# Patient Record
Sex: MALE | Race: BLACK OR AFRICAN AMERICAN | Employment: FULL TIME | URBAN - METROPOLITAN AREA
[De-identification: names, ages, dates, MRNs, and addresses within clinical notes are randomized per-mention and may not be internally consistent; named-entity substitution may affect disease eponyms.]

---

## 2017-01-13 ENCOUNTER — OP HISTORICAL/CONVERTED ENCOUNTER (OUTPATIENT)
Dept: OTHER | Age: 52
End: 2017-01-13

## 2017-02-03 ENCOUNTER — OP HISTORICAL/CONVERTED ENCOUNTER (OUTPATIENT)
Dept: OTHER | Age: 52
End: 2017-02-03

## 2017-04-12 ENCOUNTER — OP HISTORICAL/CONVERTED ENCOUNTER (OUTPATIENT)
Dept: OTHER | Age: 52
End: 2017-04-12

## 2017-04-21 ENCOUNTER — OP HISTORICAL/CONVERTED ENCOUNTER (OUTPATIENT)
Dept: OTHER | Age: 52
End: 2017-04-21

## 2017-05-17 ENCOUNTER — OP HISTORICAL/CONVERTED ENCOUNTER (OUTPATIENT)
Dept: OTHER | Age: 52
End: 2017-05-17

## 2017-07-14 ENCOUNTER — OP HISTORICAL/CONVERTED ENCOUNTER (OUTPATIENT)
Dept: OTHER | Age: 52
End: 2017-07-14

## 2018-02-21 ENCOUNTER — OP HISTORICAL/CONVERTED ENCOUNTER (OUTPATIENT)
Dept: OTHER | Age: 53
End: 2018-02-21

## 2018-06-05 ENCOUNTER — OFFICE VISIT (OUTPATIENT)
Dept: ENDOCRINOLOGY | Age: 53
End: 2018-06-05

## 2018-06-05 VITALS
BODY MASS INDEX: 39.17 KG/M2 | WEIGHT: 315 LBS | SYSTOLIC BLOOD PRESSURE: 139 MMHG | TEMPERATURE: 97 F | HEART RATE: 71 BPM | DIASTOLIC BLOOD PRESSURE: 76 MMHG | OXYGEN SATURATION: 98 % | HEIGHT: 75 IN | RESPIRATION RATE: 20 BRPM

## 2018-06-05 DIAGNOSIS — E78.2 MIXED HYPERLIPIDEMIA: ICD-10-CM

## 2018-06-05 DIAGNOSIS — I10 ESSENTIAL HYPERTENSION: ICD-10-CM

## 2018-06-05 DIAGNOSIS — E66.01 OBESITY, MORBID (HCC): ICD-10-CM

## 2018-06-05 DIAGNOSIS — E11.65 UNCONTROLLED TYPE 2 DIABETES MELLITUS WITH HYPERGLYCEMIA, WITHOUT LONG-TERM CURRENT USE OF INSULIN (HCC): Primary | ICD-10-CM

## 2018-06-05 LAB
GLUCOSE POC: 249 MG/DL
HBA1C MFR BLD HPLC: 9.7 %

## 2018-06-05 RX ORDER — DULAGLUTIDE 1.5 MG/.5ML
INJECTION, SOLUTION SUBCUTANEOUS
COMMUNITY
Start: 2018-05-22 | End: 2019-03-12 | Stop reason: SDUPTHER

## 2018-06-05 RX ORDER — VALSARTAN 320 MG/1
TABLET ORAL
COMMUNITY
Start: 2018-04-25 | End: 2018-08-08 | Stop reason: ALTCHOICE

## 2018-06-05 RX ORDER — SIMVASTATIN 20 MG/1
TABLET, FILM COATED ORAL
COMMUNITY
Start: 2018-04-26 | End: 2020-07-29 | Stop reason: SDUPTHER

## 2018-06-05 RX ORDER — SPIRONOLACTONE 25 MG/1
TABLET ORAL
COMMUNITY
Start: 2018-05-06 | End: 2020-07-29 | Stop reason: SDUPTHER

## 2018-06-05 RX ORDER — GLYBURIDE 5 MG/1
TABLET ORAL
COMMUNITY
Start: 2018-04-26 | End: 2018-06-05 | Stop reason: ALTCHOICE

## 2018-06-05 RX ORDER — SITAGLIPTIN AND METFORMIN HYDROCHLORIDE 100; 1000 MG/1; MG/1
TABLET, FILM COATED, EXTENDED RELEASE ORAL
COMMUNITY
Start: 2018-03-30 | End: 2019-03-12 | Stop reason: SDUPTHER

## 2018-06-05 RX ORDER — CARVEDILOL 12.5 MG/1
TABLET ORAL
COMMUNITY
Start: 2018-05-21 | End: 2020-07-29 | Stop reason: SDUPTHER

## 2018-06-05 RX ORDER — CHOLECALCIFEROL (VITAMIN D3) 125 MCG
CAPSULE ORAL
COMMUNITY

## 2018-06-05 RX ORDER — ASCORBIC ACID 500 MG
TABLET ORAL
COMMUNITY
End: 2021-03-22 | Stop reason: ALTCHOICE

## 2018-06-05 NOTE — PATIENT INSTRUCTIONS
--------------------------------------------------------------------------------------------    Refills    -    please call your pharmacy and have them send us a refill request    Results  -  allow up to a week for lab results to be processed and reviewed. Phone calls  -  Allow upto 24 hrs.  for non-urgent calls to be retained    Prior authorization - It may take up to 2 weeks to process, depending on your insurance    Forms  -  FMLA, DMV, patient assistance, etc. will take up to 2 weeks to process    Cancellations - please notify the office in advance if you cannot keep your appointment    Samples  - will only be dispensed at visits as supply is limited      If you are having a medical emergency call 911    --------------------------------------------------------------------------------------------      Check blood sugars only twice a day by rotation as follows    First day - before b-fast and - before lunch  Second day- before dinner and  before bed time    After 2 days go back to same rotation          Instead [id number] [facility]               Stop glyburide   Start on glipizide 10 mg twice a day, twenty min before b-fast and dinner     (Do not take it if you are not eating   Cut the pill to half if eating lesser than normal   Do not avoid lunches         Start on cycloset  0.6 mg   Start on cycloset 0.8 mg one pill first week, 2 pills second week and 3 pills on third week within 2 hours of getting up in the morning        Change janumet to janumet xr 50 /1000 mg twice a day with meals       Start on  jardiance 25   Mg , right before   b-fast    ( drink plenty of water )    Stay on Trulicity at 1.5 mg a week

## 2018-06-05 NOTE — PROGRESS NOTES
HISTORY OF PRESENT ILLNESS  Leah Payne is a 45 y.o. male. HPI  Patient here for initial visit of Type 2 diabetes mellitus . Referred : by self/pcp    H/o diabetes for many years     Current A1C is 11.6 %  From feb 2018  and symptoms/problems include polyuria, polydipsia and visual disturbances     Current diabetic medications include intensive insulin injection program.     Current monitoring regimen: home blood tests - 2 times daily  Home blood sugar records: trend: fluctuating a lot  Any episodes of hypoglycemia? yes - multiple    Weight trend: fluctuating a lot  Prior visit with dietician: no  Current diet: \"unhealthy\" diet in general  Current exercise: no regular exercise    Known diabetic complications: retinopathy, nephropathy and peripheral neuropathy  Cardiovascular risk factors: family history, dyslipidemia, diabetes mellitus, sedentary life style, male gender, hypertension, stress    Eye exam current (within one year): yes  BALJEET: unknown     Past Medical History:   Diagnosis Date    [facility]     Hypertension      History reviewed. No pertinent surgical history. Current Outpatient Prescriptions   Medication Sig    TRULICITY 1.5 XQ/0.5 mL sub-q pen     carvedilol (COREG) 12.5 mg tablet     glyBURIDE (DIABETA) 5 mg tablet     simvastatin (ZOCOR) 20 mg tablet     JANUMET -1,000 mg TM24     spironolactone (ALDACTONE) 25 mg tablet     valsartan (DIOVAN) 320 mg tablet     cholecalciferol, vitamin D3, (VITAMIN D3) 2,000 unit tab Take  by mouth.  ascorbic acid, vitamin C, (VITAMIN C) 500 mg tablet Take  by mouth.  KOLE ASPIRIN PO Take  by mouth. No current facility-administered medications for this visit. Review of Systems   Constitutional: Negative. HENT: Negative. Eyes: Negative for pain and redness. Respiratory: Negative. Cardiovascular: Negative for chest pain, palpitations and leg swelling. Gastrointestinal: Negative. Negative for constipation. Genitourinary: Negative. Musculoskeletal: Negative for myalgias. Skin: Negative. Neurological: Negative. Endo/Heme/Allergies: Negative. Psychiatric/Behavioral: Negative for depression and memory loss. The patient does not have insomnia. Physical Exam   Constitutional: He is oriented to person, place, and time. He appears well-developed and well-nourished. HENT:   Head: Normocephalic. EDENTULOUS   Eyes: Conjunctivae and EOM are normal. Pupils are equal, round, and reactive to light. Neck: Normal range of motion. Neck supple. No JVD present. No tracheal deviation present. No thyromegaly present. Cardiovascular: Normal rate, regular rhythm and normal heart sounds. Pulmonary/Chest: Effort normal and breath sounds normal.   Abdominal: Soft. Bowel sounds are normal.   Musculoskeletal: Normal range of motion. Lymphadenopathy:     He has no cervical adenopathy. Neurological: He is alert and oriented to person, place, and time. He has normal reflexes. Skin: Skin is warm. Psychiatric: He has a normal mood and affect. ASSESSMENT and PLAN    1.  Type 2 DM poorly  controlled : a1c is 9.7 %   Today by POC   Compared to over 11 %  From feb 2018     Discussed type 2 diabetes pathophysiology    Start on glipizide 10 mg twice a day, twenty min before b-fast and dinner   Start on cycloset  0.6 mg   Start on cycloset 0.8 mg one pill first week, 2 pills second week and 3 pills on third week within 2 hours of getting up in the morning    Change janumet to janumet xr 50 /1000 mg twice a day with meals   Start on  jardiance 25   Mg , right before   b-fast    ( drink plenty of water )  Stay on Trulicity at 1.5 mg a week     Patient is advised to check blood sugars 3-4 times daily   reviewed medications and side effects in detail  lab results and schedule of future lab studies reviewed with patient    specific diabetic recommendations: diabetic diet discussed in detail, written exchange diet given, low cholesterol diet, weight control and daily exercise discussed, home glucose monitoring emphasized, glucose meter dispensed to patient, all medications, side effects and compliance discussed carefully, foot care discussed and Podiatry visits discussed, annual eye examinations at Ophthalmology discussed, glycohemoglobin and other lab monitoring discussed, long term diabetic complications discussed and labs immediately prior to next visit    2. Hypoglycemia :  Educated on treating the hypoglycemia. 3. HTN : continue current care. Patient is educated about importance of compliance with anti-hypertensives especially ARB/ACEI    4. Dyslipidemia : continue current meds. Patient is educated about benefits and adverse effects of statins and explained how benefits outweigh risk. 5. use of aspirin to prevent MI and TIA's discussed      6. Obesity : Body mass index is 41.85 kg/(m^2).   Discussed TLC and started on inj incretins    > 50 % of time is spent on counseling   Patient voiced understanding her plan of care

## 2018-06-05 NOTE — LETTER
6/10/2018 7:38 PM 
 
Patient:  Jennifer Salmon YOB: 1965 Date of Visit: 6/5/2018 Dear Denzel Pacheco MD 
[address] [address] VIA Facsimile: [phone] 
 : Thank you for referring Mr. Ramón Greene to me for evaluation/treatment. Below are the relevant portions of my assessment and plan of care. Wt Readings from Last 3 Encounters:  
06/05/18 334 lb 12.8 oz (151.9 kg) Temp Readings from Last 3 Encounters:  
06/05/18 97 °F (36.1 °C) (Oral) BP Readings from Last 3 Encounters:  
06/05/18 139/76 Pulse Readings from Last 3 Encounters:  
06/05/18 71 Patient does not have meter or logbook today. Patient unsure of eye doctor's name. HISTORY OF PRESENT ILLNESS Jennifer Salmon is a 45 y.o. male. HPI Patient here for initial visit of Type 2 diabetes mellitus . Referred : by self/pcp H/o diabetes for many years Current A1C is 11.6 %  From feb 2018  and symptoms/problems include polyuria, polydipsia and visual disturbances Current diabetic medications include intensive insulin injection program.  
 
Current monitoring regimen: home blood tests - 2 times daily Home blood sugar records: trend: fluctuating a lot Any episodes of hypoglycemia? yes - multiple Weight trend: fluctuating a lot Prior visit with dietician: no 
Current diet: \"unhealthy\" diet in general 
Current exercise: no regular exercise Known diabetic complications: retinopathy, nephropathy and peripheral neuropathy Cardiovascular risk factors: family history, dyslipidemia, diabetes mellitus, sedentary life style, male gender, hypertension, stress Eye exam current (within one year): yes BALJEET: unknown Past Medical History:  
Diagnosis Date  Diabetes ([facility]  Hypertension History reviewed. No pertinent surgical history. Current Outpatient Prescriptions Medication Sig  TRULICITY 1.5 ZY/9.8 mL sub-q pen  carvedilol (COREG) 12.5 mg tablet  glyBURIDE (DIABETA) 5 mg tablet  simvastatin (ZOCOR) 20 mg tablet  JANUMET -1,000 mg TM24   
 spironolactone (ALDACTONE) 25 mg tablet  valsartan (DIOVAN) 320 mg tablet  cholecalciferol, vitamin D3, (VITAMIN D3) 2,000 unit tab Take  by mouth.  ascorbic acid, vitamin C, (VITAMIN C) 500 mg tablet Take  by mouth.  KOLE ASPIRIN PO Take  by mouth. No current facility-administered medications for this visit. Review of Systems Constitutional: Negative. HENT: Negative. Eyes: Negative for pain and redness. Respiratory: Negative. Cardiovascular: Negative for chest pain, palpitations and leg swelling. Gastrointestinal: Negative. Negative for constipation. Genitourinary: Negative. Musculoskeletal: Negative for myalgias. Skin: Negative. Neurological: Negative. Endo/Heme/Allergies: Negative. Psychiatric/Behavioral: Negative for depression and memory loss. The patient does not have insomnia. Physical Exam  
Constitutional: He is oriented to person, place, and time. He appears well-developed and well-nourished. HENT:  
Head: Normocephalic. EDENTULOUS Eyes: Conjunctivae and EOM are normal. Pupils are equal, round, and reactive to light. Neck: Normal range of motion. Neck supple. No JVD present. No tracheal deviation present. No thyromegaly present. Cardiovascular: Normal rate, regular rhythm and normal heart sounds. Pulmonary/Chest: Effort normal and breath sounds normal.  
Abdominal: Soft. Bowel sounds are normal.  
Musculoskeletal: Normal range of motion. Lymphadenopathy:  
  He has no cervical adenopathy. Neurological: He is alert and oriented to person, place, and time. He has normal reflexes. Skin: Skin is warm. Psychiatric: He has a normal mood and affect. ASSESSMENT and PLAN 1. Type 2 DM poorly  controlled : a1c is 9.7 %   Today by POC   Compared to over 11 %  From feb 2018 Discussed type 2 diabetes pathophysiology Start on glipizide 10 mg twice a day, twenty min before b-fast and dinner Start on cycloset  0.6 mg  
Start on cycloset 0.8 mg one pill first week, 2 pills second week and 3 pills on third week within 2 hours of getting up in the morning Change janumet to janumet xr 50 /1000 mg twice a day with meals Start on  jardiance 25   Mg , right before   b-fast    ( drink plenty of water ) Stay on Trulicity at 1.5 mg a week Patient is advised to check blood sugars 3-4 times daily  
reviewed medications and side effects in detail 
lab results and schedule of future lab studies reviewed with patient 
 
specific diabetic recommendations: diabetic diet discussed in detail, written exchange diet given, low cholesterol diet, weight control and daily exercise discussed, home glucose monitoring emphasized, glucose meter dispensed to patient, all medications, side effects and compliance discussed carefully, foot care discussed and Podiatry visits discussed, annual eye examinations at Ophthalmology discussed, glycohemoglobin and other lab monitoring discussed, long term diabetic complications discussed and labs immediately prior to next visit 2. Hypoglycemia :  Educated on treating the hypoglycemia. 3. HTN : continue current care. Patient is educated about importance of compliance with anti-hypertensives especially ARB/ACEI 4. Dyslipidemia : continue current meds. Patient is educated about benefits and adverse effects of statins and explained how benefits outweigh risk. 5. use of aspirin to prevent MI and TIA's discussed 6. Obesity : Body mass index is 41.85 kg/(m^2). Discussed TLC and started on inj incretins > 50 % of time is spent on counseling Patient voiced understanding her plan of care If you have questions, please do not hesitate to call me. I look forward to following Mr. Ya Zavala along with you. Sincerely, Mimi De La Torre MD

## 2018-06-05 NOTE — MR AVS SNAPSHOT
[address] [facility] [address] 
[phone] Patient: Sreekanth Olsen MRN: [id number] :1965 Visit Information Date & Time Provider Department Dept. Phone Encounter #  
 2018  4:00 PM Claudene Maffucci, MD [facility] [phone] [id number] Follow-up Instructions Return in about 2 months (around 2018). Upcoming Health Maintenance Date Due Hepatitis C Screening 1965 DTaP/Tdap/Td series (1 - Tdap) 1984 FOBT Q 1 YEAR AGE 50-75 2013 Influenza Age 5 to Adult 2018 Allergies as of 2018  Review Complete On: 2018 By: Claudene Maffucci, MD  
  
 Severity Noted Reaction Type Reactions Pcn [Penicillins]  2018    Hives Current Immunizations  Never Reviewed No immunizations on file. Not reviewed this visit You Were Diagnosed With   
  
 Codes Comments Type 2 diabetes mellitus with hyperglycemia, unspecified whether long term insulin use (HCC)    -  Primary ICD-10-CM: E11.65 ICD-9-CM: 250.00 Vitals BP Pulse Temp Resp Height(growth percentile) Weight(growth percentile) 139/76 (BP 1 Location: Right arm, BP Patient Position: Sitting) 71 97 °F (36.1 °C) (Oral) 20 6' 3\" (1.905 m) 334 lb 12.8 oz (151.9 kg) SpO2 BMI Smoking Status 98% 41.85 kg/m2 Never Smoker BMI and BSA Data Body Mass Index Body Surface Area  
 41.85 kg/m 2 2.84 m 2 Your Updated Medication List  
  
   
This list is accurate as of 18  5:13 PM.  Always use your most recent med list.  
  
  
  
  
 KOLE ASPIRIN PO Take  by mouth. carvedilol 12.5 mg tablet Commonly known as:  COREG  
  
 flash glucose scanning reader Misc Commonly known as:  FREESTYLE WALLY READER Use it as advised  
  
 flash glucose sensor Kit Commonly known as:  [address] To use one every 10 days JANUMET -1,000 mg Tm24 Generic drug:  SITagliptin-metFORMIN  
  
 simvastatin 20 mg tablet Commonly known as:  ZOCOR  
  
 spironolactone 25 mg tablet Commonly known as:  ALDACTONE  
  
 TRULICITY 1.5 JZ/1.8 mL sub-q pen Generic drug:  dulaglutide  
  
 valsartan 320 mg tablet Commonly known as:  DIOVAN  
  
 VITAMIN C 500 mg tablet Generic drug:  ascorbic acid (vitamin C) Take  by mouth. VITAMIN D3 2,000 unit Tab Generic drug:  cholecalciferol (vitamin D3) Take  by mouth. Prescriptions Printed Refills  
 flash glucose scanning reader (FREESTYLE WALLY READER) misc 0 Sig: Use it as advised Class: Print  
 flash glucose sensor (FREESTYLE WALLY SENSOR) kit 6 Sig: To use one every 10 days Class: Print We Performed the Following AMB POC GLUCOSE BLOOD, BY GLUCOSE MONITORING DEVICE [02111 CPT(R)] AMB POC HEMOGLOBIN A1C [29982 CPT(R)] Follow-up Instructions Return in about 2 months (around 8/5/2018). Patient Instructions   
-------------------------------------------------------------------------------------------- Refills    -    please call your pharmacy and have them send us a refill request 
 
Results  -  allow up to a week for lab results to be processed and reviewed. Phone calls  -  Allow upto 24 hrs. for non-urgent calls to be retained Prior authorization - It may take up to 2 weeks to process, depending on your insurance Forms  -  FMLA, DMV, patient assistance, etc. will take up to 2 weeks to process Cancellations - please notify the office in advance if you cannot keep your appointment Samples  - will only be dispensed at visits as supply is limited If you are having a medical emergency call 911 
 
-------------------------------------------------------------------------------------------- Check blood sugars only twice a day by rotation as follows First day - before b-fast and - before lunch Second day- before dinner and  before bed time After 2 days go back to same rotation Instead [address] Stop glyburide Start on glipizide 10 mg twice a day, twenty min before b-fast and dinner (Do not take it if you are not eating Cut the pill to half if eating lesser than normal  
Do not avoid lunches Start on cycloset  0.6 mg  
Start on cycloset 0.8 mg one pill first week, 2 pills second week and 3 pills on third week within 2 hours of getting up in the morning Change janumet to janumet xr 50 /1000 mg twice a day with meals Start on  jardiance 25   Mg , right before   b-fast    ( drink plenty of water ) Stay on Trulicity at 1.5 mg a week Introducing [facility]! [facility] introduces [employer] patient portal. Now you can access parts of your medical record, email your doctor's office, and request medication refills online. 1. In your internet browser, go to [email or web address] 2. Click on the First Time User? Click Here link in the Sign In box. You will see the New Member Sign Up page. 3. Enter your [employer] Access Code exactly as it appears below. You will not need to use this code after youve completed the sign-up process. If you do not sign up before the expiration date, you must request a new code. · [employer] Access Code: [email or web address] Expires: 9/3/2018  3:37 PM 
 
4. Enter the last four digits of your Social Security Number (xxxx) and Date of Birth (mm/dd/yyyy) as indicated and click Submit. You will be taken to the next sign-up page. 5. Create a [employer] ID. This will be your [employer] login ID and cannot be changed, so think of one that is secure and easy to remember. 6. Create a [employer] password. You can change your password at any time. 7. Enter your Password Reset Question and Answer.  This can be used at a later time if you forget your password. 8. Enter your e-mail address. You will receive e-mail notification when new information is available in [address] 9. Click Sign Up. You can now view and download portions of your medical record. 10. Click the Download Summary menu link to download a portable copy of your medical information. If you have questions, please visit the Frequently Asked Questions section of the [employer] website. Remember, [employer] is NOT to be used for urgent needs. For medical emergencies, dial 911. Now available from your iPhone and Android! Please provide this summary of care documentation to your next provider. Your primary care clinician is listed as Yulisa Hudson. If you have any questions after today's visit, please call [phone].

## 2018-06-05 NOTE — PROGRESS NOTES
Wt Readings from Last 3 Encounters:   06/05/18 334 lb 12.8 oz (151.9 kg)     Temp Readings from Last 3 Encounters:   06/05/18 97 °F (36.1 °C) (Oral)     BP Readings from Last 3 Encounters:   06/05/18 139/76     Pulse Readings from Last 3 Encounters:   06/05/18 71     Patient does not have meter or logbook today. Patient unsure of eye doctor's name.

## 2018-06-10 PROBLEM — E11.65 UNCONTROLLED TYPE 2 DIABETES MELLITUS WITH HYPERGLYCEMIA, WITHOUT LONG-TERM CURRENT USE OF INSULIN (HCC): Status: ACTIVE | Noted: 2018-06-10

## 2018-06-10 PROBLEM — I10 ESSENTIAL HYPERTENSION: Status: ACTIVE | Noted: 2018-06-10

## 2018-06-10 PROBLEM — E78.2 MIXED HYPERLIPIDEMIA: Status: ACTIVE | Noted: 2018-06-10

## 2018-06-11 DIAGNOSIS — E11.65 UNCONTROLLED TYPE 2 DIABETES MELLITUS WITH HYPERGLYCEMIA, WITHOUT LONG-TERM CURRENT USE OF INSULIN (HCC): ICD-10-CM

## 2018-06-11 RX ORDER — EMPAGLIFLOZIN 25 MG/1
TABLET, FILM COATED ORAL
Qty: 90 TAB | Refills: 6 | Status: SHIPPED | OUTPATIENT
Start: 2018-06-11 | End: 2019-03-12 | Stop reason: SDUPTHER

## 2018-08-08 ENCOUNTER — DOCUMENTATION ONLY (OUTPATIENT)
Dept: ENDOCRINOLOGY | Age: 53
End: 2018-08-08

## 2018-08-08 RX ORDER — IRBESARTAN 300 MG/1
300 TABLET ORAL
Qty: 30 TAB | Refills: 6 | Status: SHIPPED | OUTPATIENT
Start: 2018-08-08 | End: 2018-08-08 | Stop reason: SDUPTHER

## 2018-08-09 RX ORDER — IRBESARTAN 300 MG/1
TABLET ORAL
Qty: 90 TAB | Refills: 6 | Status: SHIPPED | OUTPATIENT
Start: 2018-08-09 | End: 2019-03-12 | Stop reason: SDUPTHER

## 2018-09-21 ENCOUNTER — OP HISTORICAL/CONVERTED ENCOUNTER (OUTPATIENT)
Dept: OTHER | Age: 53
End: 2018-09-21

## 2019-03-12 ENCOUNTER — OFFICE VISIT (OUTPATIENT)
Dept: ENDOCRINOLOGY | Age: 54
End: 2019-03-12

## 2019-03-12 VITALS
BODY MASS INDEX: 39.17 KG/M2 | OXYGEN SATURATION: 97 % | RESPIRATION RATE: 18 BRPM | SYSTOLIC BLOOD PRESSURE: 120 MMHG | TEMPERATURE: 95.7 F | HEIGHT: 75 IN | HEART RATE: 69 BPM | DIASTOLIC BLOOD PRESSURE: 61 MMHG | WEIGHT: 315 LBS

## 2019-03-12 DIAGNOSIS — I10 ESSENTIAL HYPERTENSION: ICD-10-CM

## 2019-03-12 DIAGNOSIS — E11.65 UNCONTROLLED TYPE 2 DIABETES MELLITUS WITH HYPERGLYCEMIA, WITHOUT LONG-TERM CURRENT USE OF INSULIN (HCC): ICD-10-CM

## 2019-03-12 DIAGNOSIS — E11.65 UNCONTROLLED TYPE 2 DIABETES MELLITUS WITH HYPERGLYCEMIA (HCC): Primary | ICD-10-CM

## 2019-03-12 DIAGNOSIS — E66.01 OBESITY, MORBID (HCC): ICD-10-CM

## 2019-03-12 DIAGNOSIS — E78.2 MIXED HYPERLIPIDEMIA: ICD-10-CM

## 2019-03-12 RX ORDER — SITAGLIPTIN AND METFORMIN HYDROCHLORIDE 100; 1000 MG/1; MG/1
TABLET, FILM COATED, EXTENDED RELEASE ORAL
Qty: 180 TAB | Refills: 4 | Status: SHIPPED | OUTPATIENT
Start: 2019-03-12 | End: 2019-09-10 | Stop reason: SDUPTHER

## 2019-03-12 RX ORDER — LANCETS
EACH MISCELLANEOUS
Qty: 100 EACH | Refills: 11 | Status: SHIPPED | OUTPATIENT
Start: 2019-03-12 | End: 2019-03-13 | Stop reason: SDUPTHER

## 2019-03-12 RX ORDER — BLOOD-GLUCOSE METER
EACH MISCELLANEOUS
Qty: 1 EACH | Refills: 0 | Status: SHIPPED | OUTPATIENT
Start: 2019-03-12

## 2019-03-12 RX ORDER — IRBESARTAN 150 MG/1
150 TABLET ORAL
COMMUNITY
End: 2020-01-24 | Stop reason: SDUPTHER

## 2019-03-12 RX ORDER — GLIPIZIDE 5 MG/1
TABLET ORAL
Qty: 180 TAB | Refills: 4 | Status: SHIPPED | OUTPATIENT
Start: 2019-03-12 | End: 2020-07-29

## 2019-03-12 NOTE — PROGRESS NOTES
1. Have you been to the ER, urgent care clinic since your last visit? Hospitalized since your last visit? No    2. Have you seen or consulted any other health care providers outside of the [address] [place] [address] since your last visit? Include any pap smears or colon screening. No     Chief Complaint   Patient presents with    Diabetes     followup     Learning assessment complete  Abuse Screening Questionnaire 3/12/2019   Do you ever feel afraid of your partner? N   Are you in a relationship with someone who physically or mentally threatens you? N   Is it safe for you to go home?  Y     Wt Readings from Last 3 Encounters:   03/12/19 334 lb 6.4 oz (151.7 kg)   06/05/18 334 lb 12.8 oz (151.9 kg)     Temp Readings from Last 3 Encounters:   03/12/19 95.7 °F (35.4 °C) (Oral)   06/05/18 97 °F (36.1 °C) (Oral)     BP Readings from Last 3 Encounters:   03/12/19 120/61   06/05/18 139/76     Pulse Readings from Last 3 Encounters:   03/12/19 69   06/05/18 71     Lab Results   Component Value Date/Time    Hemoglobin A1c 9.4 (H) 08/20/2018 08:35 AM    Hemoglobin A1c (POC) 9.7 06/05/2018 04:00 PM    Hemoglobin A1c, External 11.6 02/21/2018

## 2019-03-12 NOTE — PROGRESS NOTES
HISTORY OF PRESENT ILLNESS  Jennifer Salmon is a 52 y.o. male. Patient here for  FIRST f/u after  initial visit of Type 2 diabetes mellitus  From June 2018     COULD NOT F/U SOONER   GOT BUSY     CONTINUED TO F/U WITH PCP   GLYBURIDE WAS RESTARTED         Old history :  Referred : by self/pcp    H/o diabetes for many years     Current A1C is 11.6 %  From feb 2018  and symptoms/problems include polyuria, polydipsia and visual disturbances     Current diabetic medications include intensive insulin injection program.     Current monitoring regimen: home blood tests - 2 times daily  Home blood sugar records: trend: fluctuating a lot  Any episodes of hypoglycemia? yes - multiple    Weight trend: fluctuating a lot  Prior visit with dietician: no  Current diet: \"unhealthy\" diet in general  Current exercise: no regular exercise    Known diabetic complications: retinopathy, nephropathy and peripheral neuropathy  Cardiovascular risk factors: family history, dyslipidemia, diabetes mellitus, sedentary life style, male gender, hypertension, stress    Eye exam current (within one year): yes  BALJEET: unknown     Past Medical History:   Diagnosis Date    Diabetes ([facility] [address]     Hypertension      History reviewed. No pertinent surgical history. Current Outpatient Medications   Medication Sig    irbesartan (AVAPRO) 150 mg tablet Take 150 mg by mouth nightly.  JARDIANCE 25 mg tablet TAKE 1 TABLET BY MOUTH DAILY BEFORE BREAKFAST    TRULICITY 1.5 SM/3.3 mL sub-q pen     carvedilol (COREG) 12.5 mg tablet     simvastatin (ZOCOR) 20 mg tablet     JANUMET -1,000 mg TM24     spironolactone (ALDACTONE) 25 mg tablet     cholecalciferol, vitamin D3, (VITAMIN D3) 2,000 unit tab Take  by mouth.  ascorbic acid, vitamin C, (VITAMIN C) 500 mg tablet Take  by mouth.  KOLE ASPIRIN PO Take  by mouth.     bromocriptine (CYCLOSET) 0.8 mg tablet One pill a day for first week then 2 pills a day for second week and 3 pills a day for third week    flash glucose scanning reader (FREESTYLE WALLY READER) [facility] Use it as advised    flash glucose sensor (FREESTYLE WALLY SENSOR) kit To use one every 10 days     No current facility-administered medications for this visit. Review of Systems   Constitutional: Negative. HENT: Negative. Eyes: Negative for pain and redness. Respiratory: Negative. Cardiovascular: Negative for chest pain, palpitations and leg swelling. Gastrointestinal: Negative. Negative for constipation. Genitourinary: Negative. Musculoskeletal: Negative for myalgias. Skin: Negative. Neurological: Negative. Endo/Heme/Allergies: Negative. Psychiatric/Behavioral: Negative for depression and memory loss. The patient does not have insomnia. Physical Exam   Constitutional: He is oriented to person, place, and time. He appears well-developed and well-nourished. HENT:   Head: Normocephalic. EDENTULOUS   Eyes: Conjunctivae and EOM are normal. Pupils are equal, round, and reactive to light. Neck: Normal range of motion. Neck supple. No JVD present. No tracheal deviation present. No thyromegaly present. Cardiovascular: Normal rate, regular rhythm and normal heart sounds. Pulmonary/Chest: Effort normal and breath sounds normal.   Abdominal: Soft. Bowel sounds are normal.   Musculoskeletal: Normal range of motion. Lymphadenopathy:     He has no cervical adenopathy. Neurological: He is alert and oriented to person, place, and time. He has normal reflexes. Skin: Skin is warm. Psychiatric: He has a normal mood and affect.          Lab Results   Component Value Date/Time    Hemoglobin A1c 9.4 (H) 08/20/2018 08:35 AM    Hemoglobin A1c 6.6 (H) 05/05/2009 12:58 PM    Hemoglobin A1c, External 11.6 02/21/2018    Glucose 165 (H) 08/20/2018 08:35 AM    Glucose  06/05/2018 04:00 PM    Microalb/Creat ratio (ug/mg creat.) <4.9 08/20/2018 08:35 AM    LDL, calculated 67 08/20/2018 08:35 AM    Creatinine 0.94 08/20/2018 08:35 AM      Lab Results   Component Value Date/Time    Cholesterol, total 124 08/20/2018 08:35 AM    HDL Cholesterol 32 (L) 08/20/2018 08:35 AM    LDL, calculated 67 08/20/2018 08:35 AM    Triglyceride 123 08/20/2018 08:35 AM    CHOL/HDL Ratio 5.4 (H) 05/05/2009 12:58 PM     Lab Results   Component Value Date/Time    ALT (SGPT) 11 08/20/2018 08:35 AM    AST (SGOT) 14 08/20/2018 08:35 AM    Alk. phosphatase 75 08/20/2018 08:35 AM    Bilirubin, total 0.7 08/20/2018 08:35 AM    Albumin 4.4 08/20/2018 08:35 AM    Protein, total 7.2 08/20/2018 08:35 AM    PLATELET 031 06/95/3308 12:58 PM     Lab Results   Component Value Date/Time    GFR est non-AA 91 08/20/2018 08:35 AM    GFR est  08/20/2018 08:35 AM    Creatinine 0.94 08/20/2018 08:35 AM    BUN 16 08/20/2018 08:35 AM    Sodium 142 08/20/2018 08:35 AM    Potassium 4.9 08/20/2018 08:35 AM    Chloride 99 08/20/2018 08:35 AM    CO2 27 08/20/2018 08:35 AM           ASSESSMENT and PLAN    1. Type 2 DM poorly  controlled : a1c is    ??    9.4 %  From august 2018      COMPARED TO    9.7 %   From June 2018    Compared to over 11 %  From feb 2018     Discussed type 2 diabetes pathophysiology AGAIN  TODAY AT SECOND VISIT   He has been taking all the meds as prescribed  And he got most of them filled by pcp in the interim     Resume  glipizide 5 mg twice a day, twenty min before b-fast and dinner , STOP GLYBURIDE    cycloset  0.8 mg  3 pills DAILY  within 2 hours of getting up in the morning     janumet xr 50 /1000 mg twice a day with meals    jardiance 25   Mg , right before   b-fast    ( drink plenty of water )  Stay on Trulicity at 1.5 mg a week     Patient is advised to check blood sugars 2-3 times daily   reviewed medications and side effects in detail  lab results and schedule of future lab studies reviewed with patient      2. Hypoglycemia :  Educated on treating the hypoglycemia. 3.  HTN : continue SPIRONOLACTONE  25 MG A DAY  AND IRBESARTAN 150 MG A DAY . Patient is educated about importance of compliance with anti-hypertensives especially ARB/ACEI    4. Dyslipidemia : continue SIMVASTATIN 20 MG . Patient is educated about benefits and adverse effects of statins and explained how benefits outweigh risk. 5. use of aspirin to prevent MI and TIA's discussed      6. Obesity : Body mass index is 41.8 kg/m². Discussed TLC and started on inj incretins        7.  HE F/U WITH [facility]ROlogist  BUT PT HAS NO KIDNEY ISSUES     > 50 % of time is spent on counseling   Patient voiced understanding her plan of care

## 2019-03-12 NOTE — PATIENT INSTRUCTIONS
--------------------------------------------------------------------------------------------    Refills    -    please call your pharmacy and have them send us a refill request    Results  -  allow up to a week for lab results to be processed and reviewed. Phone calls  -  Allow upto 24 hrs.  for non-urgent calls to be retained    Prior authorization - It may take up to 2 weeks to process, depending on your insurance    Forms  -  FMLA, DMV, patient assistance, etc. will take up to 2 weeks to process    Cancellations - please notify the office in advance if you cannot keep your appointment    Samples  - will only be dispensed at visits as supply is limited      If you are having a medical emergency call 911    --------------------------------------------------------------------------------------------      Check blood sugars only twice a day by rotation as follows    First day - before b-fast and - before lunch  Second day- before dinner and  before bed time    After 2 days go back to same rotation          Check blood sugars only twice a day by rotation as follows    First day - before b-fast and - before lunch  Second day- before dinner and  before bed time    After 2 days go back to same rotaTion              START ON   glipizide 5 mg twice a day, twenty min before b-fast and dinner , STOP GLYBURIDE     (Do not take it if you are not eating   Cut the pill to half if eating lesser than normal   Do not avoid lunches )        StaY  on cycloset  0.8 mg    3 pills  EVERY DAY  within 2 hours of getting up in the morning     janumet xr 50 /1000 mg twice a day with meals     StaY   jardiance 25   Mg , right before   b-fast    ( drink plenty of water )      Stay on Trulicity at 1.5 mg a week

## 2019-03-13 LAB
ALBUMIN SERPL-MCNC: 4.4 G/DL (ref 3.5–5.5)
ALBUMIN/CREAT UR: <3.8 MG/G CREAT (ref 0–30)
ALBUMIN/GLOB SERPL: 1.4 {RATIO} (ref 1.2–2.2)
ALP SERPL-CCNC: 67 IU/L (ref 39–117)
ALT SERPL-CCNC: 23 IU/L (ref 0–44)
AST SERPL-CCNC: 24 IU/L (ref 0–40)
BILIRUB SERPL-MCNC: 1 MG/DL (ref 0–1.2)
BUN SERPL-MCNC: 14 MG/DL (ref 6–24)
BUN/CREAT SERPL: 13 (ref 9–20)
CALCIUM SERPL-MCNC: 9 MG/DL (ref 8.7–10.2)
CHLORIDE SERPL-SCNC: 98 MMOL/L (ref 96–106)
CHOLEST SERPL-MCNC: 131 MG/DL (ref 100–199)
CO2 SERPL-SCNC: 24 MMOL/L (ref 20–29)
CREAT SERPL-MCNC: 1.09 MG/DL (ref 0.76–1.27)
CREAT UR-MCNC: 78.6 MG/DL
EST. AVERAGE GLUCOSE BLD GHB EST-MCNC: 292 MG/DL
GLOBULIN SER CALC-MCNC: 3.1 G/DL (ref 1.5–4.5)
GLUCOSE SERPL-MCNC: 148 MG/DL (ref 65–99)
HBA1C MFR BLD: 11.8 % (ref 4.8–5.6)
HDLC SERPL-MCNC: 31 MG/DL
INTERPRETATION, 910389: NORMAL
LDLC SERPL CALC-MCNC: 62 MG/DL (ref 0–99)
Lab: NORMAL
MICROALBUMIN UR-MCNC: <3 UG/ML
POTASSIUM SERPL-SCNC: 4.3 MMOL/L (ref 3.5–5.2)
PROT SERPL-MCNC: 7.5 G/DL (ref 6–8.5)
SODIUM SERPL-SCNC: 139 MMOL/L (ref 134–144)
TRIGL SERPL-MCNC: 191 MG/DL (ref 0–149)
VLDLC SERPL CALC-MCNC: 38 MG/DL (ref 5–40)

## 2019-03-13 RX ORDER — LANCETS
1 EACH MISCELLANEOUS 2 TIMES DAILY
Qty: 300 PACKAGE | Refills: 11 | Status: SHIPPED | OUTPATIENT
Start: 2019-03-13

## 2019-04-07 DIAGNOSIS — E11.65 UNCONTROLLED TYPE 2 DIABETES MELLITUS WITH HYPERGLYCEMIA, WITHOUT LONG-TERM CURRENT USE OF INSULIN (HCC): ICD-10-CM

## 2019-05-14 DIAGNOSIS — E11.65 UNCONTROLLED TYPE 2 DIABETES MELLITUS WITH HYPERGLYCEMIA, WITHOUT LONG-TERM CURRENT USE OF INSULIN (HCC): ICD-10-CM

## 2019-06-04 ENCOUNTER — OP HISTORICAL/CONVERTED ENCOUNTER (OUTPATIENT)
Dept: OTHER | Age: 54
End: 2019-06-04

## 2019-08-30 DIAGNOSIS — E11.65 UNCONTROLLED TYPE 2 DIABETES MELLITUS WITH HYPERGLYCEMIA (HCC): ICD-10-CM

## 2019-08-31 LAB
ALBUMIN SERPL-MCNC: 4.1 G/DL (ref 3.5–5.5)
ALBUMIN/CREAT UR: <7 MG/G CREAT (ref 0–30)
ALBUMIN/GLOB SERPL: 1.3 {RATIO} (ref 1.2–2.2)
ALP SERPL-CCNC: 84 IU/L (ref 39–117)
ALT SERPL-CCNC: 16 IU/L (ref 0–44)
AST SERPL-CCNC: 14 IU/L (ref 0–40)
BILIRUB SERPL-MCNC: 0.6 MG/DL (ref 0–1.2)
BUN SERPL-MCNC: 16 MG/DL (ref 6–24)
BUN/CREAT SERPL: 16 (ref 9–20)
CALCIUM SERPL-MCNC: 9.2 MG/DL (ref 8.7–10.2)
CHLORIDE SERPL-SCNC: 99 MMOL/L (ref 96–106)
CHOLEST SERPL-MCNC: 142 MG/DL (ref 100–199)
CO2 SERPL-SCNC: 25 MMOL/L (ref 20–29)
CREAT SERPL-MCNC: 1 MG/DL (ref 0.76–1.27)
CREAT UR-MCNC: 42.7 MG/DL
EST. AVERAGE GLUCOSE BLD GHB EST-MCNC: 303 MG/DL
GLOBULIN SER CALC-MCNC: 3.1 G/DL (ref 1.5–4.5)
GLUCOSE SERPL-MCNC: 291 MG/DL (ref 65–99)
HBA1C MFR BLD: 12.2 % (ref 4.8–5.6)
HDLC SERPL-MCNC: 31 MG/DL
INTERPRETATION, 910389: NORMAL
LDLC SERPL CALC-MCNC: 47 MG/DL (ref 0–99)
Lab: NORMAL
MICROALBUMIN UR-MCNC: <3 UG/ML
POTASSIUM SERPL-SCNC: 4.6 MMOL/L (ref 3.5–5.2)
PROT SERPL-MCNC: 7.2 G/DL (ref 6–8.5)
SODIUM SERPL-SCNC: 141 MMOL/L (ref 134–144)
TRIGL SERPL-MCNC: 318 MG/DL (ref 0–149)
VLDLC SERPL CALC-MCNC: 64 MG/DL (ref 5–40)

## 2019-09-10 ENCOUNTER — OFFICE VISIT (OUTPATIENT)
Dept: ENDOCRINOLOGY | Age: 54
End: 2019-09-10

## 2019-09-10 VITALS
HEART RATE: 64 BPM | DIASTOLIC BLOOD PRESSURE: 56 MMHG | TEMPERATURE: 96 F | BODY MASS INDEX: 39.17 KG/M2 | SYSTOLIC BLOOD PRESSURE: 104 MMHG | WEIGHT: 315 LBS | OXYGEN SATURATION: 98 % | RESPIRATION RATE: 16 BRPM | HEIGHT: 75 IN

## 2019-09-10 DIAGNOSIS — E11.65 UNCONTROLLED TYPE 2 DIABETES MELLITUS WITH HYPERGLYCEMIA, WITHOUT LONG-TERM CURRENT USE OF INSULIN (HCC): ICD-10-CM

## 2019-09-10 DIAGNOSIS — E66.01 OBESITY, MORBID (HCC): ICD-10-CM

## 2019-09-10 DIAGNOSIS — E78.2 MIXED HYPERLIPIDEMIA: ICD-10-CM

## 2019-09-10 DIAGNOSIS — E11.65 UNCONTROLLED TYPE 2 DIABETES MELLITUS WITH HYPERGLYCEMIA, WITHOUT LONG-TERM CURRENT USE OF INSULIN (HCC): Primary | ICD-10-CM

## 2019-09-10 DIAGNOSIS — I10 ESSENTIAL HYPERTENSION: ICD-10-CM

## 2019-09-10 RX ORDER — GLIPIZIDE 10 MG/1
10 TABLET ORAL 2 TIMES DAILY
Qty: 180 TAB | Refills: 4 | Status: SHIPPED | OUTPATIENT
Start: 2019-09-10 | End: 2020-09-14

## 2019-09-10 RX ORDER — OMEPRAZOLE 20 MG/1
20 CAPSULE, DELAYED RELEASE ORAL DAILY
COMMUNITY
End: 2020-07-29 | Stop reason: SDUPTHER

## 2019-09-10 NOTE — PROGRESS NOTES
Lab Results   Component Value Date/Time    Hemoglobin A1c 12.2 (H) 08/30/2019 11:49 AM    Hemoglobin A1c 11.8 (H) 03/12/2019 03:38 PM    Hemoglobin A1c 9.4 (H) 08/20/2018 08:35 AM    Hemoglobin A1c, External 11.6 02/21/2018     Lab Results   Component Value Date/Time    Cholesterol, total 142 08/30/2019 11:49 AM    HDL Cholesterol 31 (L) 08/30/2019 11:49 AM    LDL, calculated 47 08/30/2019 11:49 AM    VLDL, calculated 64 (H) 08/30/2019 11:49 AM    Triglyceride 318 (H) 08/30/2019 11:49 AM    CHOL/HDL Ratio 5.4 (H) 05/05/2009 12:58 PM     Lab Results   Component Value Date/Time    Microalb/Creat ratio (ug/mg creat.) <7.0 08/30/2019 11:49 AM     Diabetic Foot and Eye Exam HM Status   Topic Date Due    Diabetic Foot Care  08/16/1973    Eye Exam  08/16/1973     Wt Readings from Last 3 Encounters:   09/10/19 316 lb 8 oz (143.6 kg)   03/12/19 334 lb 6.4 oz (151.7 kg)   06/05/18 334 lb 12.8 oz (151.9 kg)     Temp Readings from Last 3 Encounters:   09/10/19 96 °F (35.6 °C)   03/12/19 95.7 °F (35.4 °C) (Oral)   06/05/18 97 °F (36.1 °C) (Oral)     BP Readings from Last 3 Encounters:   09/10/19 104/56   03/12/19 120/61   06/05/18 139/76     Pulse Readings from Last 3 Encounters:   09/10/19 64   03/12/19 69   06/05/18 71

## 2019-09-10 NOTE — PROGRESS NOTES
HISTORY OF PRESENT ILLNESS  Feliciano Sol is a 62 y.o. male. Patient here for   f/u after last  visit of Type 2 diabetes mellitus  From March 12 2019    Pt is staying busy   No meter is brought    ? Diet compliance is not sure         Old history :      COULD NOT F/U SOONER   GOT BUSY     CONTINUED TO F/U WITH PCP   GLYBURIDE WAS RESTARTED         Old history :  Referred : by self/pcp    H/o diabetes for many years     Current A1C is 11.6 %  From feb 2018  and symptoms/problems include polyuria, polydipsia and visual disturbances     Current diabetic medications include intensive insulin injection program.     Current monitoring regimen: home blood tests - 2 times daily  Home blood sugar records: trend: fluctuating a lot  Any episodes of hypoglycemia? yes - multiple    Weight trend: fluctuating a lot  Prior visit with dietician: no  Current diet: \"unhealthy\" diet in general  Current exercise: no regular exercise    Known diabetic complications: retinopathy, nephropathy and peripheral neuropathy  Cardiovascular risk factors: family history, dyslipidemia, diabetes mellitus, sedentary life style, male gender, hypertension, stress    Eye exam current (within one year): yes  BALJEET: unknown     Past Medical History:   Diagnosis Date    Diabetes ([facility] [address]     Hypertension      History reviewed. No pertinent surgical history. Current Outpatient Medications   Medication Sig    omeprazole (PRILOSEC) 20 mg capsule Take 20 mg by mouth daily.  lancets (LANCETS,ULTRA THIN) 26 gauge misc 1 Package by SubCUTAneous route two (2) times a day. USE TO TOUCH BLOOD SUGAR TWICE DAILY    irbesartan (AVAPRO) 150 mg tablet Take 150 mg by mouth nightly.  Blood-Glucose Meter (ONETOUCH VERIO FLEX) misc Use to check blood sugars twice daily. Dx. Code E11.65    dulaglutide (TRULICITY) 1.5 QN/1.2 mL sub-q pen Inject 1.5mg once a week.     empagliflozin (JARDIANCE) 25 mg tablet TAKE 1 TABLET BY MOUTH DAILY BEFORE BREAKFAST    JANUMET XR 100-1,000 mg TM24 Take one tab twice daily with meals    bromocriptine (CYCLOSET) 0.8 mg tablet Take 3 pills everyday within 2 hours of waking up in the morning.  carvedilol (COREG) 12.5 mg tablet     simvastatin (ZOCOR) 20 mg tablet     spironolactone (ALDACTONE) 25 mg tablet     cholecalciferol, vitamin D3, (VITAMIN D3) 2,000 unit tab Take  by mouth.  ascorbic acid, vitamin C, (VITAMIN C) 500 mg tablet Take  by mouth.  KOLE ASPIRIN PO Take  by mouth.  glucose blood VI test strips (ONETOUCH VERIO) strip USE TO CHECK BLOOD SUGAR TWICE DAILY    glipiZIDE (GLUCOTROL) 5 mg tablet Take one tab twice daily. STOP GLYBURIDE    flash glucose scanning reader (FREESTYLE WALLY READER) [facility] Use it as advised    flash glucose sensor (FREESTYLE WALLY SENSOR) kit To use one every 10 days     No current facility-administered medications for this visit. Review of Systems   Constitutional: Negative. HENT: Negative. Eyes: Negative for pain and redness. Respiratory: Negative. Cardiovascular: Negative for chest pain, palpitations and leg swelling. Gastrointestinal: Negative. Negative for constipation. Genitourinary: Negative. Musculoskeletal: Negative for myalgias. Skin: Negative. Neurological: Negative. Endo/Heme/Allergies: Negative. Psychiatric/Behavioral: Negative for depression and memory loss. The patient does not have insomnia. Physical Exam   Constitutional: He is oriented to person, place, and time. He appears well-developed and well-nourished. HENT:   Head: Normocephalic. EDENTULOUS   Eyes: Conjunctivae and EOM are normal. Pupils are equal, round, and reactive to light. Neck: Normal range of motion. Neck supple. No JVD present. No tracheal deviation present. No thyromegaly present. Cardiovascular: Normal rate, regular rhythm and normal heart sounds. Pulmonary/Chest: Effort normal and breath sounds normal.   Abdominal: Soft.  Bowel sounds are normal. Musculoskeletal: Normal range of motion. Lymphadenopathy:     He has no cervical adenopathy. Neurological: He is alert and oriented to person, place, and time. He has normal reflexes. Skin: Skin is warm. Psychiatric: He has a normal mood and affect. Lab Results   Component Value Date/Time    Hemoglobin A1c 12.2 (H) 08/30/2019 11:49 AM    Hemoglobin A1c 11.8 (H) 03/12/2019 03:38 PM    Hemoglobin A1c 9.4 (H) 08/20/2018 08:35 AM    Hemoglobin A1c, External 11.6 02/21/2018    Glucose 291 (H) 08/30/2019 11:49 AM    Glucose  06/05/2018 04:00 PM    Microalb/Creat ratio (ug/mg creat.) <7.0 08/30/2019 11:49 AM    LDL, calculated 47 08/30/2019 11:49 AM    Creatinine 1.00 08/30/2019 11:49 AM      Lab Results   Component Value Date/Time    Cholesterol, total 142 08/30/2019 11:49 AM    HDL Cholesterol 31 (L) 08/30/2019 11:49 AM    LDL, calculated 47 08/30/2019 11:49 AM    Triglyceride 318 (H) 08/30/2019 11:49 AM    CHOL/HDL Ratio 5.4 (H) 05/05/2009 12:58 PM     Lab Results   Component Value Date/Time    ALT (SGPT) 16 08/30/2019 11:49 AM    AST (SGOT) 14 08/30/2019 11:49 AM    Alk. phosphatase 84 08/30/2019 11:49 AM    Bilirubin, total 0.6 08/30/2019 11:49 AM    Albumin 4.1 08/30/2019 11:49 AM    Protein, total 7.2 08/30/2019 11:49 AM    PLATELET 093 86/70/1201 12:58 PM     Lab Results   Component Value Date/Time    GFR est non-AA 84 08/30/2019 11:49 AM    GFR est AA 97 08/30/2019 11:49 AM    Creatinine 1.00 08/30/2019 11:49 AM    BUN 16 08/30/2019 11:49 AM    Sodium 141 08/30/2019 11:49 AM    Potassium 4.6 08/30/2019 11:49 AM    Chloride 99 08/30/2019 11:49 AM    CO2 25 08/30/2019 11:49 AM           ASSESSMENT and PLAN    1.  Type 2 DM poorly  controlled : a1c is    12.2 %     From  August 2019    Compared to       ??    9.4 %  From august 2018      COMPARED TO    9.7 %   From June 2018    Compared to over 11 %  From feb 2018     THERE HAS BEEN NO CONTROL BECAUSE OF LACK OF GLIPIZIDE   HE DID NTO READ THE PRINTED INSTRUCTIONS   HE NEVER CALLED THE OFFICE BACK     Discussed type 2 diabetes pathophysiology AGAIN  TODAY AT thirD VISIT   No meter is brought   He has NOT been taking GLIPIZIDE ( PRESCRIPTION WAS SENT IN [facility] 2019 WITH ALLTHE OTHER MEDS BUT PHARMACY CLAIMS THAT IT DID NTO RECEIVE IT )    INCREASING   glipizide  TO 10 mg twice a day, twenty min before b-fast and dinner , STOPped  GLYBURIDE   CONTINUE ON    cycloset  0.8 mg  3 pills DAILY  within 2 hours of getting up in the morning   janumet xr 50 /1000 mg twice a day with meals    jardiance 25   Mg , right before   b-fast    ( drink plenty of water )  Stay on Trulicity at 1.5 mg a week       HE MIGHT NEED INSULINS TO CONTROL     Patient is advised to check blood sugars 2-3 times daily   reviewed medications and side effects in detail  lab results and schedule of future lab studies reviewed with patient      2. Hypoglycemia :  Educated on treating the hypoglycemia. 3. HTN : continue SPIRONOLACTONE  25 MG A DAY  AND IRBESARTAN 150 MG A DAY . Patient is educated about importance of compliance with anti-hypertensives especially ARB/ACEI    4. Dyslipidemia : continue SIMVASTATIN 20 MG . Patient is educated about benefits and adverse effects of statins and explained how benefits outweigh risk. 5. use of aspirin to prevent MI and TIA's discussed      6. Obesity : Body mass index is 39.56 kg/m². Discussed TLC and started on inj incretins        7.  HE F/U WITH [facility]RO[facility]  BUT PT HAS NO KIDNEY ISSUES       I CALLED THE PHARMACY MYSELF AND FOUND OUT THAT GLIPIZIDE WAS NTO GOTTEN BY THEM     > 50 % of time is spent on counseling   Patient voiced understanding her plan of care

## 2019-09-10 NOTE — PATIENT INSTRUCTIONS
--------------------------------------------------------------------------------------------    Refills    -    please call your pharmacy and have them send us a refill request    Results  -  allow up to a week for lab results to be processed and reviewed. Phone calls  -  Allow upto 24 hrs.  for non-urgent calls to be retained    Prior authorization - It may take up to 2 weeks to process, depending on your insurance    Forms  -  FMLA, DMV, patient assistance, etc. will take up to 2 weeks to process    Cancellations - please notify the office in advance if you cannot keep your appointment    Samples  - will only be dispensed at visits as supply is limited      If you are having a medical emergency call 911    --------------------------------------------------------------------------------------------      Check blood sugars only twice a day by rotation as follows    First day - before b-fast and - before lunch  Second day- before dinner and  before bed time    After 2 days go back to same rotation          Check blood sugars only twice a day by rotation as follows    First day - before b-fast and - before lunch  Second day- before dinner and  before bed time    After 2 days go back to same rotaTion          ----------------------------------------------------------------------------------------------------------------------------------------------------------------------------------    INCREASE  glipizide TO 10  mg twice a day, twenty min before b-fast and dinner , STOP GLYBURIDE     (Do not take it if you are not eating   Cut the pill to half if eating lesser than normal   Do not avoid lunches )        StaY  on cycloset  0.8 mg    3 pills  EVERY DAY  within 2 hours of getting up in the morning     janumet xr 50 /1000 mg twice a day with meals     StaY   jardiance 25   Mg , right before   b-fast    ( drink plenty of water )      Stay on Trulicity at 1.5 mg a week

## 2019-10-25 ENCOUNTER — OFFICE VISIT (OUTPATIENT)
Dept: ENDOCRINOLOGY | Age: 54
End: 2019-10-25

## 2019-10-25 ENCOUNTER — TELEPHONE (OUTPATIENT)
Dept: ENDOCRINOLOGY | Age: 54
End: 2019-10-25

## 2019-10-25 VITALS
RESPIRATION RATE: 18 BRPM | HEIGHT: 75 IN | TEMPERATURE: 96.3 F | DIASTOLIC BLOOD PRESSURE: 65 MMHG | OXYGEN SATURATION: 97 % | HEART RATE: 67 BPM | SYSTOLIC BLOOD PRESSURE: 117 MMHG | BODY MASS INDEX: 39.17 KG/M2 | WEIGHT: 315 LBS

## 2019-10-25 DIAGNOSIS — E78.2 MIXED HYPERLIPIDEMIA: ICD-10-CM

## 2019-10-25 DIAGNOSIS — I10 ESSENTIAL HYPERTENSION: ICD-10-CM

## 2019-10-25 DIAGNOSIS — E11.65 UNCONTROLLED TYPE 2 DIABETES MELLITUS WITH HYPERGLYCEMIA, WITHOUT LONG-TERM CURRENT USE OF INSULIN (HCC): ICD-10-CM

## 2019-10-25 DIAGNOSIS — E66.01 OBESITY, MORBID (HCC): ICD-10-CM

## 2019-10-25 DIAGNOSIS — E11.65 UNCONTROLLED TYPE 2 DIABETES MELLITUS WITH HYPERGLYCEMIA, WITHOUT LONG-TERM CURRENT USE OF INSULIN (HCC): Primary | ICD-10-CM

## 2019-10-25 LAB — HBA1C MFR BLD HPLC: 10.9 %

## 2019-10-25 NOTE — PATIENT INSTRUCTIONS
-------------------------------------------------------------------------------------------- Refills    -    please call your pharmacy and have them send us a refill request 
 
Results  -  allow up to a week for lab results to be processed and reviewed. Phone calls  -  Allow upto 24 hrs. for non-urgent calls to be retained Prior authorization - It may take up to 2 weeks to process, depending on your insurance Forms  -  FMLA, DMV, patient assistance, etc. will take up to 2 weeks to process Cancellations - please notify the office in advance if you cannot keep your appointment Samples  - will only be dispensed at visits as supply is limited If you are having a medical emergency call 911 
 
-------------------------------------------------------------------------------------------- Check blood sugars only twice a day by rotation as follows First day - before b-fast and - before lunch Second day- before dinner and  before bed time After 2 days go back to same rotation Do not skip meals Do not eat in between meals Reduce carbs- pasta, rice, potatoes, bread Do not drink juices or sodas Donot eat peanut butter Do not eat sugar free cookies and cakes Do not eat peaches, grapes, oranges, pineapples and fruit medleys   
 
 
 
 
 
---------------------------------------------------------------------------------------------------------------------------------------------------------------------------------- 
 
INCREASE  glipizide TO 10  mg twice a day, twenty min before b-fast and dinner , STOP GLYBURIDE  
 
(Do not take it if you are not eating Cut the pill to half if eating lesser than normal  
Do not avoid lunches ) Increase  cycloset  0.8 mg  
 gradually to 4  Pills  To    5 pills   EVERY DAY  within 2 hours of getting up in the morning Change to  janumet xr 50 /1000 mg twice a day with meals StaY   jardiance 25   Mg , right before   b-fast    ( drink plenty of water ) Stay on Trulicity at 1.5 mg a week Start on  Pioglitazone  30 mg a day

## 2019-10-25 NOTE — PROGRESS NOTES
HISTORY OF PRESENT ILLNESS  Sujey Naidu is a 62 y.o. male. Patient here for   f/u after last  visit of Type 2 diabetes mellitus  From sept 10  2019      Gained 2 lbs   Eating right   Walking   Checking sugars         Old history :     Pt is staying busy   No meter is brought    ? Diet compliance is not sure         Old history :      COULD NOT F/U SOONER   GOT BUSY     CONTINUED TO F/U WITH PCP   GLYBURIDE WAS RESTARTED         Old history :  Referred : by self/pcp    H/o diabetes for many years     Current A1C is 11.6 %  From feb 2018  and symptoms/problems include polyuria, polydipsia and visual disturbances     Current diabetic medications include intensive insulin injection program.     Current monitoring regimen: home blood tests - 2 times daily  Home blood sugar records: trend: fluctuating a lot  Any episodes of hypoglycemia? yes - multiple    Weight trend: fluctuating a lot  Prior visit with dietician: no  Current diet: \"unhealthy\" diet in general  Current exercise: no regular exercise    Known diabetic complications: retinopathy, nephropathy and peripheral neuropathy  Cardiovascular risk factors: family history, dyslipidemia, diabetes mellitus, sedentary life style, male gender, hypertension, stress    Eye exam current (within one year): yes  BALJEET: unknown     Past Medical History:   Diagnosis Date    Diabetes ([facility] [address]     Hypertension      History reviewed. No pertinent surgical history. Current Outpatient Medications   Medication Sig    omeprazole (PRILOSEC) 20 mg capsule Take 20 mg by mouth daily.  empagliflozin (JARDIANCE) 25 mg tablet TAKE 1 TABLET BY MOUTH DAILY BEFORE BREAKFAST    dulaglutide (TRULICITY) 1.5 KY/4.0 mL sub-q pen Inject 1.5mg once a week.  SITagliptin-metFORMIN (JANUMET XR) 100-1,000 mg TM24 Take one tab twice daily with meals    bromocriptine (CYCLOSET) 0.8 mg tablet Take 3 pills everyday within 2 hours of waking up in the morning.     lancets (LANCETS,ULTRA THIN) 26 gauge misc 1 Package by SubCUTAneous route two (2) times a day. USE TO TOUCH BLOOD SUGAR TWICE DAILY    glucose blood VI test strips (ONETOUCH VERIO) strip USE TO CHECK BLOOD SUGAR TWICE DAILY    irbesartan (AVAPRO) 150 mg tablet Take 150 mg by mouth nightly.  Blood-Glucose Meter (ONETOUCH VERIO FLEX) misc Use to check blood sugars twice daily. Dx. Code E11.65    carvedilol (COREG) 12.5 mg tablet     simvastatin (ZOCOR) 20 mg tablet     spironolactone (ALDACTONE) 25 mg tablet     cholecalciferol, vitamin D3, (VITAMIN D3) 2,000 unit tab Take  by mouth.  ascorbic acid, vitamin C, (VITAMIN C) 500 mg tablet Take  by mouth.  KOLE ASPIRIN PO Take  by mouth.  glipiZIDE (GLUCOTROL) 10 mg tablet Take 1 Tab by mouth two (2) times a day.  glipiZIDE (GLUCOTROL) 5 mg tablet Take one tab twice daily. STOP GLYBURIDE    flash glucose scanning reader (FREESTYLE WALLY READER) [facility] [place] as advised    flash glucose sensor (FREESTYLE WALLY SENSOR) kit To use one every 10 days     No current facility-administered medications for this visit. Review of Systems   Constitutional: Negative. HENT: Negative. Eyes: Negative for pain and redness. Respiratory: Negative. Cardiovascular: Negative for chest pain, palpitations and leg swelling. Gastrointestinal: Negative. Negative for constipation. Genitourinary: Negative. Musculoskeletal: Negative for myalgias. Skin: Negative. Neurological: Negative. Endo/Heme/Allergies: Negative. Psychiatric/Behavioral: Negative for depression and memory loss. The patient does not have insomnia. Physical Exam   Constitutional: He is oriented to person, place, and time. He appears well-developed and well-nourished. HENT:   Head: Normocephalic. EDENTULOUS   Eyes: Conjunctivae and EOM are normal. Pupils are equal, round, and reactive to light. Neck: Normal range of motion. Neck supple. No JVD present. No tracheal deviation present.  No thyromegaly present. Cardiovascular: Normal rate, regular rhythm and normal heart sounds. Pulmonary/Chest: Effort normal and breath sounds normal.   Abdominal: Soft. Bowel sounds are normal.   Musculoskeletal: Normal range of motion. Lymphadenopathy:     He has no cervical adenopathy. Neurological: He is alert and oriented to person, place, and time. He has normal reflexes. Skin: Skin is warm. Psychiatric: He has a normal mood and affect. Lab Results   Component Value Date/Time    Hemoglobin A1c 12.2 (H) 08/30/2019 11:49 AM    Hemoglobin A1c 11.8 (H) 03/12/2019 03:38 PM    Hemoglobin A1c 9.4 (H) 08/20/2018 08:35 AM    Hemoglobin A1c, External 11.6 02/21/2018    Glucose 291 (H) 08/30/2019 11:49 AM    Glucose  06/05/2018 04:00 PM    Microalb/Creat ratio (ug/mg creat.) <7.0 08/30/2019 11:49 AM    LDL, calculated 47 08/30/2019 11:49 AM    Creatinine 1.00 08/30/2019 11:49 AM      Lab Results   Component Value Date/Time    Cholesterol, total 142 08/30/2019 11:49 AM    HDL Cholesterol 31 (L) 08/30/2019 11:49 AM    LDL, calculated 47 08/30/2019 11:49 AM    Triglyceride 318 (H) 08/30/2019 11:49 AM    CHOL/HDL Ratio 5.4 (H) 05/05/2009 12:58 PM     Lab Results   Component Value Date/Time    ALT (SGPT) 16 08/30/2019 11:49 AM    AST (SGOT) 14 08/30/2019 11:49 AM    Alk. phosphatase 84 08/30/2019 11:49 AM    Bilirubin, total 0.6 08/30/2019 11:49 AM    Albumin 4.1 08/30/2019 11:49 AM    Protein, total 7.2 08/30/2019 11:49 AM    PLATELET 172 67/19/5787 12:58 PM     Lab Results   Component Value Date/Time    GFR est non-AA 84 08/30/2019 11:49 AM    GFR est AA 97 08/30/2019 11:49 AM    Creatinine 1.00 08/30/2019 11:49 AM    BUN 16 08/30/2019 11:49 AM    Sodium 141 08/30/2019 11:49 AM    Potassium 4.6 08/30/2019 11:49 AM    Chloride 99 08/30/2019 11:49 AM    CO2 25 08/30/2019 11:49 AM           ASSESSMENT and PLAN    1.  Type 2 DM poorly  controlled : a1c is   10.9 %      From      By POC,   Oct 2019    Compared to 12.2 %     From  August 2019    Compared to       ??    9.4 %  From august 2018      COMPARED TO    9.7 %   From June 2018    Compared to over 11 %  From feb 2018       Small improvement in CONTROL    meter is brought   He has NOT been taking GLIPIZIDE ( PRESCRIPTION WAS SENT IN [facility] 2019 WITH ALLTHE OTHER MEDS BUT PHARMACY CLAIMS THAT IT DID NTO RECEIVE IT )    INCREASING   glipizide  TO 10 mg twice a day, twenty min before b-fast and dinner , STOPped  GLYBURIDE   CONTINUE ON    cycloset  0.8 mg  3 pills DAILY  within 2 hours of getting up in the morning   janumet xr 50 /1000 mg twice a day with meals    jardiance 25   Mg , right before   b-fast    ( drink plenty of water )  Stay on Trulicity at 1.5 mg a week   ADDING ACTOS       HE MIGHT NEED INSULINS TO CONTROL but he is on [employer] license     Patient is advised to check blood sugars 2-3 times daily   reviewed medications and side effects in detail  lab results and schedule of future lab studies reviewed with patient      2. Hypoglycemia :  Educated on treating the hypoglycemia. 3. HTN : continue SPIRONOLACTONE  25 MG A DAY  AND IRBESARTAN 150 MG A DAY . Patient is educated about importance of compliance with anti-hypertensives especially ARB/ACEI    4. Dyslipidemia : continue SIMVASTATIN 20 MG . Patient is educated about benefits and adverse effects of statins and explained how benefits outweigh risk. 5. use of aspirin to prevent MI and TIA's discussed      6. Obesity : Body mass index is 39.82 kg/m². Discussed TLC and started on inj incretins        7.  HE F/U WITH NEPHROlogist  BUT PT HAS NO KIDNEY ISSUES       > 50 % of time is spent on counseling   Patient voiced understanding her plan of care

## 2019-10-25 NOTE — PROGRESS NOTES
Room 2    Identified pt with two pt identifiers(name and ). Reviewed record in preparation for visit and have obtained necessary documentation. All patient medications has been reviewed. Chief Complaint   Patient presents with    Diabetes     6 week f/u. NO LABS    Medication Evaluation     janumet xr       Health Maintenance Due   Topic    Hepatitis C Screening     Pneumococcal 0-64 years (1 of 1 - PPSV23)    FOOT EXAM Q1     EYE EXAM RETINAL OR DILATED     DTaP/Tdap/Td series (1 - Tdap)    Shingrix Vaccine Age 50> (1 of 2)    FOBT Q 1 YEAR AGE 50-75     Influenza Age 5 to Adult        Vitals:    10/25/19 1503   BP: 117/65   Pulse: 67   Resp: 18   Temp: 96.3 °F (35.7 °C)   TempSrc: Oral   SpO2: 97%   Weight: 318 lb 9.6 oz (144.5 kg)   Height: 6' 3\" (1.905 m)   PainSc:   0 - No pain       Wt Readings from Last 3 Encounters:   10/25/19 318 lb 9.6 oz (144.5 kg)   09/10/19 316 lb 8 oz (143.6 kg)   19 334 lb 6.4 oz (151.7 kg)     Temp Readings from Last 3 Encounters:   10/25/19 96.3 °F (35.7 °C) (Oral)   09/10/19 96 °F (35.6 °C)   19 95.7 °F (35.4 °C) (Oral)     BP Readings from Last 3 Encounters:   10/25/19 117/65   09/10/19 104/56   19 120/61     Pulse Readings from Last 3 Encounters:   10/25/19 67   09/10/19 64   19 69       Lab Results   Component Value Date/Time    Hemoglobin A1c 12.2 (H) 2019 11:49 AM    Hemoglobin A1c (POC) 9.7 2018 04:00 PM    Hemoglobin A1c, External 11.6 2018       Coordination of Care Questionnaire:   1) Have you been to an emergency room, urgent care, or hospitalized since your last visit?   no       2. Have seen or consulted any other health care provider since your last visit? NO    3) Do you have an Advanced Directive/ Living Will in place?  NO  If yes, do we have a copy on file NO  If no, would you like information NO    Patient is accompanied by self I have received verbal consent from Marcin Angeles to discuss any/all medical information while they are present in the room.

## 2019-10-25 NOTE — TELEPHONE ENCOUNTER
Two pt identifiers confirmed. Pt called in regards to giving LPN information for ophthalmologist eye exam. Information was requested 10/25/19 during [address] Pt agreed to calling LPN once receiving that information    Pt verbalized understanding of information discussed w/ no further questions at this time.

## 2019-10-25 NOTE — LETTER
10/27/19 Patient: Anna Diaz YOB: 1965 Date of Visit: 10/25/2019 Boogie Yuan NP 
[address] [id number] [address] [place] [address] VIA Facsimile: [phone] Dear Boogie Yuan NP, Thank you for referring Mr. Nadya Kuhn to [address] [place] [address] for evaluation. My notes for this consultation are attached. If you have questions, please do not hesitate to call me. I look forward to following your patient along with you. Sincerely, Dot Horner MD

## 2019-10-26 RX ORDER — PIOGLITAZONEHYDROCHLORIDE 30 MG/1
30 TABLET ORAL DAILY
Qty: 90 TAB | Refills: 4 | Status: SHIPPED | OUTPATIENT
Start: 2019-10-26 | End: 2020-11-19 | Stop reason: SDUPTHER

## 2019-11-06 ENCOUNTER — TELEPHONE (OUTPATIENT)
Dept: ENDOCRINOLOGY | Age: 54
End: 2019-11-06

## 2019-11-06 NOTE — PROGRESS NOTES
Pt is here to get clarity     Pharmacy was filling in old doc's prescriptions at 100/1000 all this time     And that confused the pt and me       Jonh Tamez MD

## 2020-01-17 ENCOUNTER — HOSPITAL ENCOUNTER (OUTPATIENT)
Dept: LAB | Age: 55
Discharge: HOME | End: 2020-01-17

## 2020-01-17 DIAGNOSIS — E78.2 MIXED HYPERLIPIDEMIA: ICD-10-CM

## 2020-01-17 DIAGNOSIS — E66.01 OBESITY, MORBID (HCC): ICD-10-CM

## 2020-01-17 DIAGNOSIS — E11.65 UNCONTROLLED TYPE 2 DIABETES MELLITUS WITH HYPERGLYCEMIA, WITHOUT LONG-TERM CURRENT USE OF INSULIN (HCC): ICD-10-CM

## 2020-01-17 DIAGNOSIS — I10 ESSENTIAL HYPERTENSION: ICD-10-CM

## 2020-01-18 LAB
ALBUMIN SERPL-MCNC: 3.8 G/DL (ref 3.5–5)
ALBUMIN/GLOB SERPL: 1 {RATIO} (ref 1.1–2.2)
ALP SERPL-CCNC: 60 U/L (ref 45–117)
ALT SERPL-CCNC: 16 U/L (ref 12–78)
ANION GAP SERPL CALC-SCNC: 7 MMOL/L (ref 5–15)
AST SERPL-CCNC: 12 U/L (ref 15–37)
BILIRUB SERPL-MCNC: 1.1 MG/DL (ref 0.2–1)
BUN SERPL-MCNC: 17 MG/DL (ref 6–20)
BUN/CREAT SERPL: 19 (ref 12–20)
CALCIUM SERPL-MCNC: 8.9 MG/DL (ref 8.5–10.1)
CHLORIDE SERPL-SCNC: 104 MMOL/L (ref 97–108)
CHOLEST SERPL-MCNC: 110 MG/DL
CO2 SERPL-SCNC: 25 MMOL/L (ref 21–32)
CREAT SERPL-MCNC: 0.91 MG/DL (ref 0.7–1.3)
CREAT UR-MCNC: 60.3 MG/DL
EST. AVERAGE GLUCOSE BLD GHB EST-MCNC: 214 MG/DL
GLOBULIN SER CALC-MCNC: 3.8 G/DL (ref 2–4)
GLUCOSE SERPL-MCNC: 139 MG/DL (ref 65–100)
HBA1C MFR BLD: 9.1 % (ref 4–5.6)
HDLC SERPL-MCNC: 34 MG/DL
HDLC SERPL: 3.2 {RATIO} (ref 0–5)
LDLC SERPL CALC-MCNC: 51.2 MG/DL (ref 0–100)
LIPID PROFILE,FLP: NORMAL
MICROALBUMIN UR-MCNC: 0.53 MG/DL
MICROALBUMIN/CREAT UR-RTO: 9 MG/G (ref 0–30)
POTASSIUM SERPL-SCNC: 5.1 MMOL/L (ref 3.5–5.1)
PROT SERPL-MCNC: 7.6 G/DL (ref 6.4–8.2)
SODIUM SERPL-SCNC: 136 MMOL/L (ref 136–145)
TRIGL SERPL-MCNC: 124 MG/DL (ref ?–150)
VLDLC SERPL CALC-MCNC: 24.8 MG/DL

## 2020-01-24 ENCOUNTER — OFFICE VISIT (OUTPATIENT)
Dept: ENDOCRINOLOGY | Age: 55
End: 2020-01-24

## 2020-01-24 VITALS
DIASTOLIC BLOOD PRESSURE: 77 MMHG | RESPIRATION RATE: 20 BRPM | SYSTOLIC BLOOD PRESSURE: 134 MMHG | HEART RATE: 66 BPM | OXYGEN SATURATION: 98 % | TEMPERATURE: 96.4 F | WEIGHT: 315 LBS | HEIGHT: 75 IN | BODY MASS INDEX: 39.17 KG/M2

## 2020-01-24 DIAGNOSIS — E78.2 MIXED HYPERLIPIDEMIA: ICD-10-CM

## 2020-01-24 DIAGNOSIS — I10 ESSENTIAL HYPERTENSION: ICD-10-CM

## 2020-01-24 DIAGNOSIS — E11.65 UNCONTROLLED TYPE 2 DIABETES MELLITUS WITH HYPERGLYCEMIA, WITHOUT LONG-TERM CURRENT USE OF INSULIN (HCC): Primary | ICD-10-CM

## 2020-01-24 DIAGNOSIS — E66.01 OBESITY, MORBID (HCC): ICD-10-CM

## 2020-01-24 RX ORDER — IRBESARTAN 150 MG/1
150 TABLET ORAL
Qty: 90 TAB | Refills: 4 | Status: SHIPPED | OUTPATIENT
Start: 2020-01-24 | End: 2020-07-29 | Stop reason: SDUPTHER

## 2020-01-24 NOTE — PROGRESS NOTES
HISTORY OF PRESENT ILLNESS  Abril Adan is a 62 y.o. male. Patient here for   f/u after last  visit of Type 2 diabetes mellitus  From sept 10  2019    Gained 2 lbs   Eating right   Walking   Checking sugars         Old history :     Pt is staying busy   No meter is brought    ? Diet compliance is not sure         Old history :      COULD NOT F/U SOONER   GOT BUSY     CONTINUED TO F/U WITH PCP   GLYBURIDE WAS RESTARTED         Old history :  Referred : by self/pcp    H/o diabetes for many years     Current A1C is 11.6 %  From feb 2018  and symptoms/problems include polyuria, polydipsia and visual disturbances     Current diabetic medications include intensive insulin injection program.     Current monitoring regimen: home blood tests - 2 times daily  Home blood sugar records: trend: fluctuating a lot  Any episodes of hypoglycemia? yes - multiple    Weight trend: fluctuating a lot  Prior visit with dietician: no  Current diet: \"unhealthy\" diet in general  Current exercise: no regular exercise    Known diabetic complications: retinopathy, nephropathy and peripheral neuropathy  Cardiovascular risk factors: family history, dyslipidemia, diabetes mellitus, sedentary life style, male gender, hypertension, stress    Eye exam current (within one year): yes  BALJEET: unknown     Past Medical History:   Diagnosis Date    Diabetes ([facility] [address]     Hypertension      History reviewed. No pertinent surgical history. Current Outpatient Medications   Medication Sig    SITagliptin-metFORMIN (JANUMET XR) 50-1,000 mg TM24 One pill twice  A  Day stop 100/1000    bromocriptine (CYCLOSET) 0.8 mg tablet Gradually increase to 5 pills everyday within 2 hours of waking up in the morning.  pioglitazone (ACTOS) 30 mg tablet Take 1 Tab by mouth daily.  omeprazole (PRILOSEC) 20 mg capsule Take 20 mg by mouth daily.     empagliflozin (JARDIANCE) 25 mg tablet TAKE 1 TABLET BY MOUTH DAILY BEFORE BREAKFAST    dulaglutide (TRULICITY) 1.5 PG/5.6 mL sub-q pen Inject 1.5mg once a week.  glipiZIDE (GLUCOTROL) 10 mg tablet Take 1 Tab by mouth two (2) times a day.  lancets (LANCETS,ULTRA THIN) 26 gauge misc 1 Package by SubCUTAneous route two (2) times a day. USE TO TOUCH BLOOD SUGAR TWICE DAILY    glucose blood VI test strips (ONETOUCH VERIO) strip USE TO CHECK BLOOD SUGAR TWICE DAILY    irbesartan (AVAPRO) 150 mg tablet Take 150 mg by mouth nightly.  Blood-Glucose Meter (ONETOUCH VERIO FLEX) misc Use to check blood sugars twice daily. Dx. Code E11.65    carvedilol (COREG) 12.5 mg tablet     simvastatin (ZOCOR) 20 mg tablet     spironolactone (ALDACTONE) 25 mg tablet     cholecalciferol, vitamin D3, (VITAMIN D3) 2,000 unit tab Take  by mouth.  ascorbic acid, vitamin C, (VITAMIN C) 500 mg tablet Take  by mouth.  KOLE ASPIRIN PO Take  by mouth.  glipiZIDE (GLUCOTROL) 5 mg tablet Take one tab twice daily. STOP GLYBURIDE    flash glucose scanning reader (FREESTYLE WALLY READER) [facility] [place] as advised    flash glucose sensor (FREESTYLE WALLY SENSOR) kit To use one every 10 days     No current facility-administered medications for this visit. Review of Systems   Constitutional: Negative. HENT: Negative. Eyes: Negative for pain and redness. Respiratory: Negative. Cardiovascular: Negative for chest pain, palpitations and leg swelling. Gastrointestinal: Negative. Negative for constipation. Genitourinary: Negative. Musculoskeletal: Negative for myalgias. Skin: Negative. Neurological: Negative. Endo/Heme/Allergies: Negative. Psychiatric/Behavioral: Negative for depression and memory loss. The patient does not have insomnia. Physical Exam   Constitutional: He is oriented to person, place, and time. He appears well-developed and well-nourished. HENT:   Head: Normocephalic. EDENTULOUS   Eyes: Conjunctivae and EOM are normal. Pupils are equal, round, and reactive to light.    Neck: Normal range of motion. Neck supple. No JVD present. No tracheal deviation present. No thyromegaly present. Cardiovascular: Normal rate, regular rhythm and normal heart sounds. Pulmonary/Chest: Effort normal and breath sounds normal.   Abdominal: Soft. Bowel sounds are normal.   Musculoskeletal: Normal range of motion. Lymphadenopathy:     He has no cervical adenopathy. Neurological: He is alert and oriented to person, place, and time. He has normal reflexes. Skin: Skin is warm. Psychiatric: He has a normal mood and affect. Lab Results   Component Value Date/Time    Hemoglobin A1c 9.1 (H) 01/17/2020 09:05 AM    Hemoglobin A1c 12.2 (H) 08/30/2019 11:49 AM    Hemoglobin A1c 11.8 (H) 03/12/2019 03:38 PM    Hemoglobin A1c, External 11.6 02/21/2018    Glucose 139 (H) 01/17/2020 09:05 AM    Glucose  06/05/2018 04:00 PM    Microalbumin/Creat ratio (mg/g creat) 9 01/17/2020 09:05 AM    Microalbumin,urine random 0.53 01/17/2020 09:05 AM    LDL, calculated 51.2 01/17/2020 09:05 AM    Creatinine 0.91 01/17/2020 09:05 AM      Lab Results   Component Value Date/Time    Cholesterol, total 110 01/17/2020 09:05 AM    HDL Cholesterol 34 01/17/2020 09:05 AM    LDL, calculated 51.2 01/17/2020 09:05 AM    Triglyceride 124 01/17/2020 09:05 AM    CHOL/HDL Ratio 3.2 01/17/2020 09:05 AM     Lab Results   Component Value Date/Time    ALT (SGPT) 16 01/17/2020 09:05 AM    AST (SGOT) 12 (L) 01/17/2020 09:05 AM    Alk.  phosphatase 60 01/17/2020 09:05 AM    Bilirubin, total 1.1 (H) 01/17/2020 09:05 AM    Albumin 3.8 01/17/2020 09:05 AM    Protein, total 7.6 01/17/2020 09:05 AM    PLATELET 207 89/08/9848 12:58 PM     Lab Results   Component Value Date/Time    GFR est non-AA >60 01/17/2020 09:05 AM    GFR est AA >60 01/17/2020 09:05 AM    Creatinine 0.91 01/17/2020 09:05 AM    BUN 17 01/17/2020 09:05 AM    Sodium 136 01/17/2020 09:05 AM    Potassium 5.1 01/17/2020 09:05 AM    Chloride 104 01/17/2020 09:05 AM    CO2 25 01/17/2020 09:05 AM ASSESSMENT and PLAN    1. Type 2 DM poorly  controlled : a1c is   9.1 %     From    Jan 2020    compared to   10.9 %      From      By POC,   Oct 2019    Compared to     12.2 %     From  August 2019    Compared to       ??    9.4 %  From august 2018      COMPARED TO    9.7 %   From June 2018    Compared to over 11 %  From feb 2018        improvement in CONTROL    meter is brought   He has NOT been taking GLIPIZIDE ( PRESCRIPTION WAS SENT IN [facility] 2019 WITH ALLTHE OTHER MEDS BUT PHARMACY CLAIMS THAT IT DID NTO RECEIVE IT ) oct 2019     INCREASed  glipizide  TO 10 mg twice a day, twenty min before b-fast and dinner     CONTINUE ON    cycloset  0.8 mg  3 pills DAILY  within 2 hours of getting up in the morning   janumet xr 50 /1000 mg twice a day with meals    jardiance 25   Mg , right before   b-fast    ( drink plenty of water )  Stay on Trulicity at 1.5 mg a week   Started on  [address] but he is on [employer] license   Patient is advised to check blood sugars 2-3 times daily   reviewed medications and side effects in detail  lab results and schedule of future lab studies reviewed with patient      2. Hypoglycemia :  Educated on treating the hypoglycemia. 3. HTN : continue SPIRONOLACTONE  25 MG A DAY  AND IRBESARTAN 150 MG A DAY . Patient is educated about importance of compliance with anti-hypertensives especially ARB/ACEI    4. Dyslipidemia : continue SIMVASTATIN 20 MG . Patient is educated about benefits and adverse effects of statins and explained how benefits outweigh risk. 5. use of aspirin to prevent MI and TIA's discussed      6. Obesity : Body mass index is 39.81 kg/m². Discussed TLC and started on inj incretins        7.  HE F/U WITH NEPHROlogist  BUT PT HAS NO KIDNEY ISSUES       > 50 % of time is spent on counseling   Patient voiced understanding her plan of care

## 2020-01-24 NOTE — LETTER
1/26/20 Patient: Roxanne Velazquez YOB: 1965 Date of Visit: 1/24/2020 Jocelyn London NP 
[facility] [id number] [place] [address] VIA Facsimile: [phone] Dear Jocelyn London NP, Thank you for referring Mr. Lorena Kemp to [id number] [place] [address] for evaluation. My notes for this consultation are attached. If you have questions, please do not hesitate to call me. I look forward to following your patient along with you. Sincerely, Dmitri Paz MD

## 2020-01-24 NOTE — PROGRESS NOTES
1. Have you been to the ER, urgent care clinic since your last visit? No  Hospitalized since your last visit? 2. Have you seen or consulted any other health care providers outside of the [address] [place] [address] since your last visit? Include any pap smears or colon screening. Wt Readings from Last 3 Encounters:   01/24/20 318 lb 8 oz (144.5 kg)   10/25/19 318 lb 9.6 oz (144.5 kg)   09/10/19 316 lb 8 oz (143.6 kg)     Temp Readings from Last 3 Encounters:   01/24/20 96.4 °F (35.8 °C) (Oral)   10/25/19 96.3 °F (35.7 °C) (Oral)   09/10/19 96 °F (35.6 °C)     BP Readings from Last 3 Encounters:   01/24/20 134/77   10/25/19 117/65   09/10/19 104/56     Pulse Readings from Last 3 Encounters:   01/24/20 66   10/25/19 67   09/10/19 64     Lab Results   Component Value Date/Time    Hemoglobin A1c 9.1 (H) 01/17/2020 09:05 AM    Hemoglobin A1c (POC) 10.9 10/25/2019 03:31 PM    Hemoglobin A1c, External 11.6 02/21/2018     Patient has meter today.

## 2020-01-24 NOTE — PATIENT INSTRUCTIONS
-------------------------------------------------------------------------------------------- Refills    -    please call your pharmacy and have them send us a refill request 
 
Results  -  allow up to a week for lab results to be processed and reviewed. Phone calls  -  Allow upto 24 hrs. for non-urgent calls to be retained Prior authorization - It may take up to 2 weeks to process, depending on your insurance Forms  -  FMLA, DMV, patient assistance, etc. will take up to 2 weeks to process Cancellations - please notify the office in advance if you cannot keep your appointment Samples  - will only be dispensed at visits as supply is limited If you are having a medical emergency call 911 
 
-------------------------------------------------------------------------------------------- Check blood sugars only twice a day by rotation as follows First day - before b-fast and - before lunch Second day- before dinner and  before bed time After 2 days go back to same rotation Do not skip meals Do not eat in between meals Reduce carbs- pasta, rice, potatoes, bread Do not drink juices or sodas Donot eat peanut butter Do not eat sugar free cookies and cakes Do not eat peaches, grapes, oranges, pineapples and fruit medleys   
 
 
 
 
 
---------------------------------------------------------------------------------------------------------------------------------------------------------------------------------- 
 
INCREASE  glipizide TO 10  mg twice a day, twenty min before b-fast and dinner , STOP GLYBURIDE  
 
(Do not take it if you are not eating Cut the pill to half if eating lesser than normal  
Do not avoid lunches ) Increase  cycloset  0.8 mg To    5 pills   EVERY DAY  within 2 hours of getting up in the morning 
 
  janumet xr 50 /1000 mg twice a day with meals StaY   jardiance 25   Mg , right before   b-fast    ( drink plenty of water ) Stay on Trulicity at 1.5 mg a week Stay on  Pioglitazone  30 mg a day

## 2020-05-13 ENCOUNTER — TELEPHONE (OUTPATIENT)
Dept: ENDOCRINOLOGY | Age: 55
End: 2020-05-13

## 2020-05-13 DIAGNOSIS — E78.2 MIXED HYPERLIPIDEMIA: ICD-10-CM

## 2020-05-13 DIAGNOSIS — I10 ESSENTIAL HYPERTENSION: ICD-10-CM

## 2020-05-13 DIAGNOSIS — E11.65 UNCONTROLLED TYPE 2 DIABETES MELLITUS WITH HYPERGLYCEMIA, WITHOUT LONG-TERM CURRENT USE OF INSULIN (HCC): Primary | ICD-10-CM

## 2020-07-26 DIAGNOSIS — E11.65 UNCONTROLLED TYPE 2 DIABETES MELLITUS WITH HYPERGLYCEMIA, WITHOUT LONG-TERM CURRENT USE OF INSULIN (HCC): ICD-10-CM

## 2020-07-27 RX ORDER — CARVEDILOL 12.5 MG/1
TABLET ORAL
Qty: 180 TAB | OUTPATIENT
Start: 2020-07-27

## 2020-07-27 RX ORDER — OMEPRAZOLE 20 MG/1
CAPSULE, DELAYED RELEASE ORAL
Qty: 90 CAP | OUTPATIENT
Start: 2020-07-27

## 2020-07-29 ENCOUNTER — OFFICE VISIT (OUTPATIENT)
Dept: PRIMARY CARE CLINIC | Age: 55
End: 2020-07-29
Payer: COMMERCIAL

## 2020-07-29 VITALS
TEMPERATURE: 97 F | SYSTOLIC BLOOD PRESSURE: 128 MMHG | DIASTOLIC BLOOD PRESSURE: 84 MMHG | RESPIRATION RATE: 18 BRPM | HEIGHT: 75 IN | OXYGEN SATURATION: 96 % | BODY MASS INDEX: 39.17 KG/M2 | HEART RATE: 70 BPM | WEIGHT: 315 LBS

## 2020-07-29 DIAGNOSIS — K21.9 GASTROESOPHAGEAL REFLUX DISEASE WITHOUT ESOPHAGITIS: ICD-10-CM

## 2020-07-29 DIAGNOSIS — E11.65 UNCONTROLLED TYPE 2 DIABETES MELLITUS WITH HYPERGLYCEMIA, WITHOUT LONG-TERM CURRENT USE OF INSULIN (HCC): ICD-10-CM

## 2020-07-29 DIAGNOSIS — I10 ESSENTIAL HYPERTENSION: Primary | ICD-10-CM

## 2020-07-29 DIAGNOSIS — E78.2 MIXED HYPERLIPIDEMIA: ICD-10-CM

## 2020-07-29 PROCEDURE — 99214 OFFICE O/P EST MOD 30 MIN: CPT | Performed by: NURSE PRACTITIONER

## 2020-07-29 RX ORDER — ISOPROPYL ALCOHOL 70 ML/100ML
1 SWAB TOPICAL 2 TIMES DAILY
Qty: 200 PAD | Refills: 3 | Status: SHIPPED | OUTPATIENT
Start: 2020-07-29 | End: 2022-04-05 | Stop reason: SDUPTHER

## 2020-07-29 RX ORDER — SITAGLIPTIN AND METFORMIN HYDROCHLORIDE 100; 1000 MG/1; MG/1
100 TABLET, FILM COATED, EXTENDED RELEASE ORAL 2 TIMES DAILY
COMMUNITY
End: 2020-11-19 | Stop reason: ALTCHOICE

## 2020-07-29 RX ORDER — SPIRONOLACTONE 25 MG/1
25 TABLET ORAL DAILY
Qty: 90 TAB | Refills: 1 | Status: SHIPPED | OUTPATIENT
Start: 2020-07-29 | End: 2021-03-22 | Stop reason: SDUPTHER

## 2020-07-29 RX ORDER — OMEPRAZOLE 20 MG/1
20 CAPSULE, DELAYED RELEASE ORAL DAILY
Qty: 90 CAP | Refills: 1 | Status: SHIPPED | OUTPATIENT
Start: 2020-07-29 | End: 2021-03-22 | Stop reason: SDUPTHER

## 2020-07-29 RX ORDER — CARVEDILOL 12.5 MG/1
12.5 TABLET ORAL 2 TIMES DAILY
Qty: 180 TAB | Refills: 1 | Status: SHIPPED | OUTPATIENT
Start: 2020-07-29 | End: 2021-03-22 | Stop reason: SDUPTHER

## 2020-07-29 RX ORDER — METFORMIN HYDROCHLORIDE EXTENDED-RELEASE TABLETS 1000 MG/1
1000 TABLET, FILM COATED, EXTENDED RELEASE ORAL 2 TIMES DAILY
COMMUNITY
End: 2020-11-19 | Stop reason: ALTCHOICE

## 2020-07-29 RX ORDER — SIMVASTATIN 20 MG/1
20 TABLET, FILM COATED ORAL
Qty: 90 TAB | Refills: 1 | Status: SHIPPED | OUTPATIENT
Start: 2020-07-29 | End: 2021-03-22 | Stop reason: SDUPTHER

## 2020-07-29 RX ORDER — IRBESARTAN 150 MG/1
150 TABLET ORAL
Qty: 90 TAB | Refills: 1 | Status: SHIPPED | OUTPATIENT
Start: 2020-07-29 | End: 2021-03-22 | Stop reason: SDUPTHER

## 2020-07-29 NOTE — PROGRESS NOTES
HISTORY OF PRESENT ILLNESS  Justin Salinas is a 62 y.o. male presents to the office for medication refill and lab work    Cailin Ramirez presents to the office for medication refill and lab work. 1. Hypertension: Patients hypertension is well controlled on regimen of carvediolol, irbesartan. Denies headaches, blurred vision or dizziness. 2. Diabetes: Last A1c was 9.1%. This is managed by endocrinology, [place]   Next appointment with her is in September. 3. Hyperlipidemia: Mixed hyperlipidemia well controlled on simvastatin. 4.Acid Reflux: Patients GERD is well controlled on current regimen of omeprazole . Patient avoids triggers such as spicy food    Vitals:    07/29/20 1317   BP: 128/84   Pulse: 70   Resp: 18   Temp: 97 °F (36.1 °C)   TempSrc: Temporal   SpO2: 96%   Weight: 318 lb (144.2 kg)   Height: 6' 3\" (1.905 m)     Patient Active Problem List   Diagnosis Code    Obesity, morbid ([facility]) E66.01    Uncontrolled type 2 diabetes mellitus with hyperglycemia, without long-term current use of insulin ([facility]) E11.65    Mixed hyperlipidemia E78.2    Essential hypertension I10       Current Outpatient Medications   Medication Sig Dispense Refill    ascorbic acid (VITAMIN C PO) Vitamin C      metFORMIN ER 1,000 mg tr24 Take 1,000 mg by mouth two (2) times a day.  SITagliptin-metFORMIN (Janumet XR) 100-1,000 mg TM24 Take 100 mg by mouth two (2) times a day.  carvediloL (COREG) 12.5 mg tablet Take 1 Tab by mouth two (2) times a day. 180 Tab 1    omeprazole (PRILOSEC) 20 mg capsule Take 1 Cap by mouth daily. 90 Cap 1    simvastatin (ZOCOR) 20 mg tablet Take 1 Tab by mouth nightly. 90 Tab 1    spironolactone (ALDACTONE) 25 mg tablet Take 1 Tab by mouth daily. 90 Tab 1    irbesartan (AVAPRO) 150 mg tablet Take 1 Tab by mouth nightly. 90 Tab 1    alcohol swabs (Alcohol Pads) padm 1 Pad by Apply Externally route two (2) times a day.  200 Pad 3    glucose blood VI test strips (ONETOUCH VERIO) strip USE TO CHECK BLOOD SUGAR TWICE DAILY 200 Strip 4    bromocriptine (CYCLOSET) 0.8 mg tablet Gradually increase to 5 pills everyday within 2 hours of waking up in the morning. 450 Tab 4    empagliflozin (JARDIANCE) 25 mg tablet TAKE 1 TABLET BY MOUTH DAILY BEFORE BREAKFAST 90 Tab 4    dulaglutide (TRULICITY) 1.5 JH/2.3 mL sub-q pen Inject 1.5mg once a week. 12 Pen 4    glipiZIDE (GLUCOTROL) 10 mg tablet Take 1 Tab by mouth two (2) times a day. 180 Tab 4    lancets (LANCETS,ULTRA THIN) 26 gauge misc 1 Package by SubCUTAneous route two (2) times a day. USE TO TOUCH BLOOD SUGAR TWICE DAILY 300 Package 11    Blood-Glucose Meter (ONETOUCH VERIO FLEX) misc Use to check blood sugars twice daily. Dx. Code E11.65 1 Each 0    cholecalciferol, vitamin D3, (VITAMIN D3) 2,000 unit tab Take  by mouth.  ascorbic acid, vitamin C, (VITAMIN C) 500 mg tablet Take  by mouth.  KOLE ASPIRIN PO Take  by mouth.  pioglitazone (ACTOS) 30 mg tablet Take 1 Tab by mouth daily. 90 Tab 4     Allergies   Allergen Reactions    Pcn [Penicillins] Hives     Past Medical History:   Diagnosis Date    Allergies     Diabetes ([address]     GERD (gastroesophageal reflux disease)     Hypercholesterolemia     Hypertension      Past Surgical History:   Procedure Laterality Date    HX COLONOSCOPY      HX OTHER SURGICAL      Gallstones removed     Family History   Problem Relation Age of Onset    Diabetes Mother     Hypertension Mother     Heart Disease Father     Diabetes Maternal Grandmother     Diabetes Sister     Diabetes Brother      Social History     Tobacco Use    Smoking status: Never Smoker    Smokeless tobacco: Never Used   Substance Use Topics    Alcohol use:  Yes     Alcohol/week: 3.0 standard drinks     Types: 2 Cans of beer, 1 Shots of liquor per week     Frequency: Monthly or less     Drinks per session: 1 or 2     Binge frequency: Never           Review of Systems   Constitutional: Negative for malaise/fatigue and weight loss. Eyes: Negative for blurred vision, double vision and pain. Respiratory: Negative for cough and shortness of breath. Cardiovascular: Negative for chest pain, palpitations, orthopnea and leg swelling. Gastrointestinal: Negative for heartburn and nausea. Musculoskeletal: Negative for joint pain and myalgias. Neurological: Negative for dizziness, tingling, loss of consciousness, weakness and headaches. Physical Exam  Constitutional:       Appearance: Normal appearance. He is obese. HENT:      Head: Normocephalic and atraumatic. Eyes:      Extraocular Movements: Extraocular movements intact. Conjunctiva/sclera: Conjunctivae normal.      Pupils: Pupils are equal, round, and reactive to light. Cardiovascular:      Rate and Rhythm: Normal rate and regular rhythm. Pulses: Normal pulses. Pulmonary:      Effort: Pulmonary effort is normal.      Breath sounds: Normal breath sounds. Musculoskeletal: Normal range of motion. Skin:     General: Skin is warm and dry. Neurological:      General: No focal deficit present. Mental Status: He is alert and oriented to person, place, and time. Psychiatric:         Mood and Affect: Mood normal.         Behavior: Behavior normal.           ASSESSMENT and PLAN    Diagnoses and all orders for this visit:    1. Essential hypertension  -     carvediloL (COREG) 12.5 mg tablet; Take 1 Tab by mouth two (2) times a day. -     spironolactone (ALDACTONE) 25 mg tablet; Take 1 Tab by mouth daily. -     irbesartan (AVAPRO) 150 mg tablet; Take 1 Tab by mouth nightly. -     CBC WITH AUTOMATED DIFF  -     METABOLIC PANEL, COMPREHENSIVE    2. Uncontrolled type 2 diabetes mellitus with hyperglycemia, without long-term current use of insulin ([facility])  -     alcohol swabs (Alcohol Pads) padm; 1 Pad by Apply Externally route two (2) times a day. 3. Mixed hyperlipidemia  -     simvastatin (ZOCOR) 20 mg tablet; Take 1 Tab by mouth nightly.   - METABOLIC PANEL, COMPREHENSIVE  -     LIPID PANEL    4. Gastroesophageal reflux disease without esophagitis  -     omeprazole (PRILOSEC) 20 mg capsule; Take 1 Cap by mouth daily.          Tisha Jiménez NP

## 2020-07-30 LAB
ALBUMIN SERPL-MCNC: 4.5 G/DL (ref 3.8–4.9)
ALBUMIN/GLOB SERPL: 1.6 {RATIO} (ref 1.2–2.2)
ALP SERPL-CCNC: 64 IU/L (ref 39–117)
ALT SERPL-CCNC: 16 IU/L (ref 0–44)
AST SERPL-CCNC: 23 IU/L (ref 0–40)
BASOPHILS # BLD AUTO: 0 X10E3/UL (ref 0–0.2)
BASOPHILS NFR BLD AUTO: 0 %
BILIRUB SERPL-MCNC: 0.8 MG/DL (ref 0–1.2)
BUN SERPL-MCNC: 12 MG/DL (ref 6–24)
BUN/CREAT SERPL: 14 (ref 9–20)
CALCIUM SERPL-MCNC: 9.4 MG/DL (ref 8.7–10.2)
CHLORIDE SERPL-SCNC: 101 MMOL/L (ref 96–106)
CHOLEST SERPL-MCNC: 107 MG/DL (ref 100–199)
CO2 SERPL-SCNC: 22 MMOL/L (ref 20–29)
CREAT SERPL-MCNC: 0.83 MG/DL (ref 0.76–1.27)
EOSINOPHIL # BLD AUTO: 0.3 X10E3/UL (ref 0–0.4)
EOSINOPHIL NFR BLD AUTO: 4 %
ERYTHROCYTE [DISTWIDTH] IN BLOOD BY AUTOMATED COUNT: 13.3 % (ref 11.6–15.4)
EST. AVERAGE GLUCOSE BLD GHB EST-MCNC: 235 MG/DL
GLOBULIN SER CALC-MCNC: 2.9 G/DL (ref 1.5–4.5)
GLUCOSE SERPL-MCNC: 112 MG/DL (ref 65–99)
HBA1C MFR BLD: 9.8 % (ref 4.8–5.6)
HCT VFR BLD AUTO: 40.6 % (ref 37.5–51)
HDLC SERPL-MCNC: 32 MG/DL
HGB BLD-MCNC: 13.2 G/DL (ref 13–17.7)
IMM GRANULOCYTES # BLD AUTO: 0 X10E3/UL (ref 0–0.1)
IMM GRANULOCYTES NFR BLD AUTO: 0 %
LDLC SERPL CALC-MCNC: 50 MG/DL (ref 0–99)
LYMPHOCYTES # BLD AUTO: 1.6 X10E3/UL (ref 0.7–3.1)
LYMPHOCYTES NFR BLD AUTO: 22 %
MCH RBC QN AUTO: 26.6 PG (ref 26.6–33)
MCHC RBC AUTO-ENTMCNC: 32.5 G/DL (ref 31.5–35.7)
MCV RBC AUTO: 82 FL (ref 79–97)
MONOCYTES # BLD AUTO: 0.6 X10E3/UL (ref 0.1–0.9)
MONOCYTES NFR BLD AUTO: 9 %
NEUTROPHILS # BLD AUTO: 4.6 X10E3/UL (ref 1.4–7)
NEUTROPHILS NFR BLD AUTO: 65 %
PLATELET # BLD AUTO: 249 X10E3/UL (ref 150–450)
POTASSIUM SERPL-SCNC: 4 MMOL/L (ref 3.5–5.2)
PROT SERPL-MCNC: 7.4 G/DL (ref 6–8.5)
RBC # BLD AUTO: 4.97 X10E6/UL (ref 4.14–5.8)
SODIUM SERPL-SCNC: 143 MMOL/L (ref 134–144)
TRIGL SERPL-MCNC: 123 MG/DL (ref 0–149)
VLDLC SERPL CALC-MCNC: 25 MG/DL (ref 5–40)
WBC # BLD AUTO: 7.1 X10E3/UL (ref 3.4–10.8)

## 2020-07-30 NOTE — PROGRESS NOTES
A1c is 9.8%, his endocrinologist should be able to see this for his next appointment. Rest of lab work looks good!

## 2020-08-04 ENCOUNTER — TELEPHONE (OUTPATIENT)
Dept: PRIMARY CARE CLINIC | Age: 55
End: 2020-08-04

## 2020-08-04 NOTE — TELEPHONE ENCOUNTER
----- Message from Emerita Velasco NP sent at 7/30/2020  8:37 AM EDT -----  A1c is 9.8%, his endocrinologist should be able to see this for his next appointment. Rest of lab work looks good.

## 2020-09-13 DIAGNOSIS — E11.65 UNCONTROLLED TYPE 2 DIABETES MELLITUS WITH HYPERGLYCEMIA, WITHOUT LONG-TERM CURRENT USE OF INSULIN (HCC): ICD-10-CM

## 2020-09-14 DIAGNOSIS — E11.65 UNCONTROLLED TYPE 2 DIABETES MELLITUS WITH HYPERGLYCEMIA, WITHOUT LONG-TERM CURRENT USE OF INSULIN (HCC): ICD-10-CM

## 2020-09-14 RX ORDER — DULAGLUTIDE 1.5 MG/.5ML
INJECTION, SOLUTION SUBCUTANEOUS
Qty: 12 ML | Refills: 2 | Status: SHIPPED | OUTPATIENT
Start: 2020-09-14 | End: 2021-10-10

## 2020-09-14 RX ORDER — GLIPIZIDE 10 MG/1
TABLET ORAL
Qty: 180 TAB | Refills: 4 | Status: SHIPPED | OUTPATIENT
Start: 2020-09-14 | End: 2020-11-19 | Stop reason: SDUPTHER

## 2020-10-27 ENCOUNTER — CLINICAL SUPPORT (OUTPATIENT)
Dept: PRIMARY CARE CLINIC | Age: 55
End: 2020-10-27
Payer: COMMERCIAL

## 2020-10-27 DIAGNOSIS — Z23 ENCOUNTER FOR IMMUNIZATION: Primary | ICD-10-CM

## 2020-10-27 DIAGNOSIS — E11.65 UNCONTROLLED TYPE 2 DIABETES MELLITUS WITH HYPERGLYCEMIA, WITHOUT LONG-TERM CURRENT USE OF INSULIN (HCC): ICD-10-CM

## 2020-10-27 PROCEDURE — 90688 IIV4 VACCINE SPLT 0.5 ML IM: CPT

## 2020-10-27 PROCEDURE — 90471 IMMUNIZATION ADMIN: CPT

## 2020-10-27 RX ORDER — BROMOCRIPTINE MESYLATE 0.8 MG/1
TABLET ORAL
Qty: 450 TAB | Refills: 4 | Status: SHIPPED | OUTPATIENT
Start: 2020-10-27 | End: 2021-12-10

## 2020-10-27 NOTE — TELEPHONE ENCOUNTER
PCP: Jose Eduardo Cox NP    Last appt: Visit date not found  Future Appointments   Date Time Provider Charis Nichols   11/19/2020  3:45 PM Isaura Wadsworth MD CDE [facility]       Requested Prescriptions     Pending Prescriptions Disp Refills    bromocriptine (Cycloset) 0.8 mg tablet 450 Tab 4     Sig: Gradually increase to 5 pills everyday within 2 hours of waking up in the morning. Office Visit on 07/29/2020   Component Date Value Ref Range Status    WBC 07/29/2020 7.1  3.4 - 10.8 x10E3/uL Final    RBC 07/29/2020 4.97  4.14 - 5.80 x10E6/uL Final    HGB 07/29/2020 13.2  13.0 - 17.7 g/dL Final    HCT 07/29/2020 40.6  37.5 - 51.0 % Final    MCV 07/29/2020 82  79 - 97 fL Final    MCH 07/29/2020 26.6  26.6 - 33.0 pg Final    MCHC 07/29/2020 32.5  31.5 - 35.7 g/dL Final    RDW 07/29/2020 13.3  11.6 - 15.4 % Final    PLATELET 03/99/8830 891  150 - 450 x10E3/uL Final    NEUTROPHILS 07/29/2020 65  Not Estab. % Final    Lymphocytes 07/29/2020 22  Not Estab. % Final    MONOCYTES 07/29/2020 9  Not Estab. % Final    EOSINOPHILS 07/29/2020 4  Not Estab. % Final    BASOPHILS 07/29/2020 0  Not Estab. % Final    ABS. NEUTROPHILS 07/29/2020 4.6  1.4 - 7.0 x10E3/uL Final    Abs Lymphocytes 07/29/2020 1.6  0.7 - 3.1 x10E3/uL Final    ABS. MONOCYTES 07/29/2020 0.6  0.1 - 0.9 x10E3/uL Final    ABS. EOSINOPHILS 07/29/2020 0.3  0.0 - 0.4 x10E3/uL Final    ABS. BASOPHILS 07/29/2020 0.0  0.0 - 0.2 x10E3/uL Final    IMMATURE GRANULOCYTES 07/29/2020 0  Not Estab. % Final    ABS. IMM.  GRANS. 07/29/2020 0.0  0.0 - 0.1 x10E3/uL Final    Glucose 07/29/2020 112* 65 - 99 mg/dL Final    BUN 07/29/2020 12  6 - 24 mg/dL Final    Creatinine 07/29/2020 0.83  0.76 - 1.27 mg/dL Final    GFR est non-AA 07/29/2020 98  >59 mL/min/1.73 Final    GFR est AA 07/29/2020 114  >59 mL/min/1.73 Final    BUN/Creatinine ratio 07/29/2020 14  9 - 20 Final    Sodium 07/29/2020 143  134 - 144 mmol/L Final    Potassium 07/29/2020 4.0  3.5 - 5.2 mmol/L Final    Chloride 07/29/2020 101  96 - 106 mmol/L Final    CO2 07/29/2020 22  20 - 29 mmol/L Final    Calcium 07/29/2020 9.4  8.7 - 10.2 mg/dL Final    Protein, total 07/29/2020 7.4  6.0 - 8.5 g/dL Final    Albumin 07/29/2020 4.5  3.8 - 4.9 g/dL Final    GLOBULIN, TOTAL 07/29/2020 2.9  1.5 - 4.5 g/dL Final    A-G Ratio 07/29/2020 1.6  1.2 - 2.2 Final    Bilirubin, total 07/29/2020 0.8  0.0 - 1.2 mg/dL Final    Alk.  phosphatase 07/29/2020 64  39 - 117 IU/L Final    AST (SGOT) 07/29/2020 23  0 - 40 IU/L Final    ALT (SGPT) 07/29/2020 16  0 - 44 IU/L Final    Cholesterol, total 07/29/2020 107  100 - 199 mg/dL Final    Triglyceride 07/29/2020 123  0 - 149 mg/dL Final    HDL Cholesterol 07/29/2020 32* >39 mg/dL Final    VLDL, calculated 07/29/2020 25  5 - 40 mg/dL Final    LDL, calculated 07/29/2020 50  0 - 99 mg/dL Final    Hemoglobin A1c 07/29/2020 9.8* 4.8 - 5.6 % Final    Comment:          Prediabetes: 5.7 - 6.4           Diabetes: >6.4           Glycemic control for adults with diabetes: <7.0      Estimated average glucose 07/29/2020 235  mg/dL Final

## 2020-11-19 ENCOUNTER — TRANSCRIBE ORDER (OUTPATIENT)
Dept: ENDOCRINOLOGY | Age: 55
End: 2020-11-19

## 2020-11-19 ENCOUNTER — OFFICE VISIT (OUTPATIENT)
Dept: ENDOCRINOLOGY | Age: 55
End: 2020-11-19
Payer: COMMERCIAL

## 2020-11-19 ENCOUNTER — TELEPHONE (OUTPATIENT)
Dept: ENDOCRINOLOGY | Age: 55
End: 2020-11-19

## 2020-11-19 VITALS
SYSTOLIC BLOOD PRESSURE: 115 MMHG | BODY MASS INDEX: 39.17 KG/M2 | WEIGHT: 315 LBS | DIASTOLIC BLOOD PRESSURE: 78 MMHG | RESPIRATION RATE: 18 BRPM | HEART RATE: 69 BPM | OXYGEN SATURATION: 96 % | HEIGHT: 75 IN | TEMPERATURE: 97.7 F

## 2020-11-19 DIAGNOSIS — E78.2 MIXED HYPERLIPIDEMIA: ICD-10-CM

## 2020-11-19 DIAGNOSIS — E11.65 UNCONTROLLED TYPE 2 DIABETES MELLITUS WITH HYPERGLYCEMIA, WITHOUT LONG-TERM CURRENT USE OF INSULIN (HCC): Primary | ICD-10-CM

## 2020-11-19 DIAGNOSIS — I10 ESSENTIAL HYPERTENSION: ICD-10-CM

## 2020-11-19 LAB — HBA1C MFR BLD HPLC: 7.8 %

## 2020-11-19 PROCEDURE — 83036 HEMOGLOBIN GLYCOSYLATED A1C: CPT | Performed by: INTERNAL MEDICINE

## 2020-11-19 PROCEDURE — 99214 OFFICE O/P EST MOD 30 MIN: CPT | Performed by: INTERNAL MEDICINE

## 2020-11-19 PROCEDURE — 3051F HG A1C>EQUAL 7.0%<8.0%: CPT | Performed by: INTERNAL MEDICINE

## 2020-11-19 RX ORDER — PIOGLITAZONEHYDROCHLORIDE 30 MG/1
30 TABLET ORAL DAILY
Qty: 90 TAB | Refills: 4 | Status: SHIPPED | OUTPATIENT
Start: 2020-11-19 | End: 2022-01-24

## 2020-11-19 RX ORDER — GLIPIZIDE 10 MG/1
TABLET ORAL
Qty: 180 TAB | Refills: 4 | Status: SHIPPED | OUTPATIENT
Start: 2020-11-19 | End: 2021-12-10

## 2020-11-19 RX ORDER — SITAGLIPTIN AND METFORMIN HYDROCHLORIDE 50; 1000 MG/1; MG/1
1 TABLET, FILM COATED, EXTENDED RELEASE ORAL 2 TIMES DAILY WITH MEALS
COMMUNITY
End: 2021-03-14

## 2020-11-19 NOTE — PROGRESS NOTES
1. Have you been to the ER, urgent care clinic since your last visit? No Hospitalized since your last visit? No    2. Have you seen or consulted any other health care providers outside of the [address] [place] [address] since your last visit? Include any pap smears or colon screening. No    Wt Readings from Last 3 Encounters:   11/19/20 319 lb 9.6 oz (145 kg)   07/29/20 318 lb (144.2 kg)   01/24/20 318 lb 8 oz (144.5 kg)     Temp Readings from Last 3 Encounters:   11/19/20 97.7 °F (36.5 °C) (Temporal)   07/29/20 97 °F (36.1 °C) (Temporal)   01/24/20 96.4 °F (35.8 °C) (Oral)     BP Readings from Last 3 Encounters:   11/19/20 115/78   07/29/20 128/84   01/24/20 134/77     Pulse Readings from Last 3 Encounters:   11/19/20 69   07/29/20 70   01/24/20 66     Lab Results   Component Value Date/Time    Hemoglobin A1c 9.8 (H) 07/29/2020 02:11 PM    Hemoglobin A1c (POC) 10.9 10/25/2019 03:31 PM    Hemoglobin A1c, External 11.6 02/21/2018     Patient has meter today.

## 2020-11-19 NOTE — PROGRESS NOTES
HISTORY OF PRESENT ILLNESS  Spring Vasquez is a 60 y.o. male. Patient here for   f/u after last  visit of Type 2 diabetes mellitus  From jan 2020     Did not bring med bottles   He got the  meter       Jan 2020     Gained 2 lbs   Eating right   Walking   Checking sugars     Old history :     Pt is staying busy   No meter is brought    ? Diet compliance is not sure       Old history :      COULD NOT F/U SOONER   GOT BUSY     CONTINUED TO F/U WITH PCP   GLYBURIDE WAS RESTARTED         Old history :  Referred : by self/pcp    H/o diabetes for many years     Current A1C is 11.6 %  From feb 2018  and symptoms/problems include polyuria, polydipsia and visual disturbances     Current diabetic medications include intensive insulin injection program.     Current monitoring regimen: home blood tests - 2 times daily  Home blood sugar records: trend: fluctuating a lot  Any episodes of hypoglycemia? yes - multiple    Weight trend: fluctuating a lot  Prior visit with dietician: no  Current diet: \"unhealthy\" diet in general  Current exercise: no regular exercise    Known diabetic complications: retinopathy, nephropathy and peripheral neuropathy  Cardiovascular risk factors: family history, dyslipidemia, diabetes mellitus, sedentary life style, male gender, hypertension, stress    Eye exam current (within one year): yes  BALJEET: unknown     Past Medical History:   Diagnosis Date    Allergies     Diabetes ([address]     GERD (gastroesophageal reflux disease)     Hypercholesterolemia     Hypertension      Past Surgical History:   Procedure Laterality Date    HX COLONOSCOPY      HX OTHER SURGICAL      Gallstones removed     Current Outpatient Medications   Medication Sig    SITagliptin-metFORMIN (Janumet XR) 50-1,000 mg TM24 Take 1 Tab by mouth two (2) times daily (with meals).  bromocriptine (Cycloset) 0.8 mg tablet Gradually increase to 5 pills everyday within 2 hours of waking up in the morning.     dulaglutide (Trulicity) 1.5 mg/0.5 mL sub-q pen INJECT 1.5 MG UNDER THE SKIN AS DIRECTED ONCE A WEEK    empagliflozin (Jardiance) 25 mg tablet TAKE 1 TABLET BY MOUTH DAILY BEFORE BREAKFAST    glipiZIDE (GLUCOTROL) 10 mg tablet TAKE 1 TABLET BY MOUTH TWICE DAILY    ascorbic acid (VITAMIN C PO) Vitamin C    carvediloL (COREG) 12.5 mg tablet Take 1 Tab by mouth two (2) times a day.  omeprazole (PRILOSEC) 20 mg capsule Take 1 Cap by mouth daily.  simvastatin (ZOCOR) 20 mg tablet Take 1 Tab by mouth nightly.  spironolactone (ALDACTONE) 25 mg tablet Take 1 Tab by mouth daily.  irbesartan (AVAPRO) 150 mg tablet Take 1 Tab by mouth nightly.  alcohol swabs (Alcohol Pads) padm 1 Pad by Apply Externally route two (2) times a day.  glucose blood VI test strips (ONETOUCH VERIO) strip USE TO CHECK BLOOD SUGAR TWICE DAILY    lancets (LANCETS,ULTRA THIN) 26 gauge misc 1 Package by SubCUTAneous route two (2) times a day. USE TO TOUCH BLOOD SUGAR TWICE DAILY    Blood-Glucose Meter (ONETOUCH VERIO FLEX) misc Use to check blood sugars twice daily. Dx. Code E11.65    cholecalciferol, vitamin D3, (VITAMIN D3) 2,000 unit tab Take  by mouth.  ascorbic acid, vitamin C, (VITAMIN C) 500 mg tablet Take  by mouth.  KOLE ASPIRIN PO Take  by mouth.  metFORMIN ER 1,000 mg tr24 Take 1,000 mg by mouth two (2) times a day.  SITagliptin-metFORMIN (Janumet XR) 100-1,000 mg TM24 Take 100 mg by mouth two (2) times a day.  pioglitazone (ACTOS) 30 mg tablet Take 1 Tab by mouth daily. No current facility-administered medications for this visit. Review of Systems   Constitutional: Negative. HENT: Negative. Eyes: Negative for pain and redness. Respiratory: Negative. Cardiovascular: Negative for chest pain, palpitations and leg swelling. Gastrointestinal: Negative. Negative for constipation. Genitourinary: Negative. Musculoskeletal: Negative for myalgias. Skin: Negative. Neurological: Negative. Endo/Heme/Allergies: Negative. Psychiatric/Behavioral: Negative for depression and memory loss. The patient does not have insomnia. Physical Exam   Constitutional: He is oriented to person, place, and time. He appears well-developed and well-nourished. HENT:   Head: Normocephalic. EDENTULOUS   Eyes: Conjunctivae and EOM are normal. Pupils are equal, round, and reactive to light. Neck: Normal range of motion. Neck supple. No JVD present. No tracheal deviation present. No thyromegaly present. Cardiovascular: Normal rate, regular rhythm and normal heart sounds. Pulmonary/Chest: Effort normal and breath sounds normal.   Abdominal: Soft. Bowel sounds are normal.   Musculoskeletal: Normal range of motion. Lymphadenopathy:     He has no cervical adenopathy. Neurological: He is alert and oriented to person, place, and time. He has normal reflexes. Skin: Skin is warm. Psychiatric: He has a normal mood and affect. Lab Results   Component Value Date/Time    Hemoglobin A1c 9.8 (H) 07/29/2020 02:11 PM    Hemoglobin A1c 9.1 (H) 01/17/2020 09:05 AM    Hemoglobin A1c 12.2 (H) 08/30/2019 11:49 AM    Hemoglobin A1c, External 11.6 02/21/2018    Glucose 112 (H) 07/29/2020 02:10 PM    Glucose  06/05/2018 04:00 PM    Microalbumin/Creat ratio (mg/g creat) 9 01/17/2020 09:05 AM    Microalbumin,urine random 0.53 01/17/2020 09:05 AM    LDL, calculated 50 07/29/2020 02:10 PM    Creatinine 0.83 07/29/2020 02:10 PM      Lab Results   Component Value Date/Time    Cholesterol, total 107 07/29/2020 02:10 PM    HDL Cholesterol 32 (L) 07/29/2020 02:10 PM    LDL, calculated 50 07/29/2020 02:10 PM    Triglyceride 123 07/29/2020 02:10 PM    CHOL/HDL Ratio 3.2 01/17/2020 09:05 AM     Lab Results   Component Value Date/Time    ALT (SGPT) 16 07/29/2020 02:10 PM    Alk.  phosphatase 64 07/29/2020 02:10 PM    Bilirubin, total 0.8 07/29/2020 02:10 PM    Albumin 4.5 07/29/2020 02:10 PM    Protein, total 7.4 07/29/2020 02:10 PM    PLATELET 929 88/69/6416 02:10 PM     Lab Results   Component Value Date/Time    GFR est non-AA 98 07/29/2020 02:10 PM    GFR est  07/29/2020 02:10 PM    Creatinine 0.83 07/29/2020 02:10 PM    BUN 12 07/29/2020 02:10 PM    Sodium 143 07/29/2020 02:10 PM    Potassium 4.0 07/29/2020 02:10 PM    Chloride 101 07/29/2020 02:10 PM    CO2 22 07/29/2020 02:10 PM           ASSESSMENT and PLAN    1. Type 2 DM poorly  controlled : a1c is  7.8 % from   Today by  POC   Compared to  9.1 %     From    Jan 2020    compared to   10.9 %      From      By POC,   Oct 2019    Compared to     12.2 %     From  August 2019    Compared to       ??    9.4 %  From august 2018      COMPARED TO    9.7 %   From June 2018    Compared to over 11 %  From feb 2018        improvement in CONTROL    meter is brought       Stay on glipizide, janumet xr, trulicity , jardiance and cycloset   He is unsure of actos being taken       Jan 2020    He has NOT been taking GLIPIZIDE ( PRESCRIPTION WAS SENT [facility] WITH ALLTHE OTHER MEDS BUT PHARMACY CLAIMS THAT IT DID NTO RECEIVE IT ) oct 2019   INCREASed  glipizide  TO 10 mg twice a day, twenty min before b-fast and dinner     CONTINUE ON    cycloset  0.8 mg  3 pills DAILY  within 2 hours of getting up in the morning   janumet xr 50 /1000 mg twice a day with meals    jardiance 25   Mg , right before   b-fast    ( drink plenty of water )  Stay on Trulicity at 1.5 mg a week   Started on  [email or web address] but he is on [employer] license   Patient is advised to check blood sugars 2-3 times daily   reviewed medications and side effects in detail  lab results and schedule of future lab studies reviewed with patient      2. Hypoglycemia :  Educated on treating the hypoglycemia. 3. HTN : continue SPIRONOLACTONE  25 MG A DAY  AND IRBESARTAN 150 MG A DAY . Patient is educated about importance of compliance with anti-hypertensives especially ARB/ACEI    4.   Dyslipidemia : continue SIMVASTATIN 20 MG . Patient is educated about benefits and adverse effects of statins and explained how benefits outweigh risk. 5. use of aspirin to prevent MI and TIA's discussed      6. Obesity : Body mass index is 39.95 kg/m². Discussed TLC and started on inj incretins        7.  HE F/U WITH NEPHROlogist  BUT PT HAS NO KIDNEY ISSUES       > 50 % of time is spent on counseling   Patient voiced understanding her plan of care

## 2020-11-19 NOTE — PATIENT INSTRUCTIONS
--------------------------------------------------------------------------------------------    Refills    -    please call your pharmacy and have them send us a refill request    Results  -  allow up to a week for lab results to be processed and reviewed. Phone calls  -  Allow upto 24 hrs.  for non-urgent calls to be retained    Prior authorization - It may take up to 2 weeks to process, depending on your insurance    Forms  -  FMLA, DMV, patient assistance, etc. will take up to 2 weeks to process    Cancellations - please notify the office in advance if you cannot keep your appointment    Samples  - will only be dispensed at visits as supply is limited      If you are having a medical emergency call 911    --------------------------------------------------------------------------------------------      Check blood sugars only twice a day by rotation as follows    First day - before b-fast and - before lunch  Second day- before dinner and  before bed time    After 2 days go back to same rotation          Do not skip meals  Do not eat in between meals    Reduce carbs- pasta, rice, potatoes, bread   Do not drink juices or sodas  Donot eat peanut butter     Do not eat sugar free cookies and cakes   Do not eat peaches, grapes, oranges, pineapples and fruit medleys              ----------------------------------------------------------------------------------------------------------------------------------------------------------------------------------     glipizide TO 10  mg twice a day, twenty min before b-fast and dinner   (Do not take it if you are not eating   Cut the pill to half if eating lesser than normal   Do not avoid lunches )     cycloset  0.8 mg To    5 pills   EVERY DAY  within 2 hours of getting up in the morning     janumet xr 50 /1000 mg twice a day with meals     StaY   jardiance 25   Mg , right before   b-fast    ( drink plenty of water )    Stay on Trulicity at 1.5 mg a week     He is unsure of taking   Pioglitazone  30 mg a day - he will confirm

## 2020-11-19 NOTE — LETTER
11/22/20 Patient: Ivet Tsai YOB: 1965 Date of Visit: 11/19/2020 Demi Cosme NP 
[facility] [id number] [address] [place] [address] VIA In Basket Dear Demi Cosme NP, Thank you for referring Mr. Adam Castillo to [address] [address] for evaluation. My notes for this consultation are attached. If you have questions, please do not hesitate to call me. I look forward to following your patient along with you. Sincerely, Tomeka Rollins MD

## 2020-11-20 NOTE — TELEPHONE ENCOUNTER
----- Message from Lefty Walden sent at 11/19/2020  5:20 PM EST -----  Regarding: Dr. Sara Alcantara General Message/Vendor Calls    Caller's first and last name:      Reason for call: Regarding Rx pioglitazone (ACTOS) 30 mg was filled never picked up.        Call back required yes/no and why: Yes       Best contact number(s): [phone]      Details to clarify the request:      Lefty Walden

## 2020-11-23 NOTE — TELEPHONE ENCOUNTER
How long has it been  Since actos never got picked up from pharmacy  ?      Less than 3 months - he resumes actos   More than 3 months - he stops actos

## 2020-12-10 DIAGNOSIS — Z23 ENCOUNTER FOR IMMUNIZATION: Primary | ICD-10-CM

## 2020-12-10 PROBLEM — E78.5 HYPERLIPIDEMIA: Status: ACTIVE | Noted: 2020-12-10

## 2020-12-10 PROBLEM — M19.90 OSTEOARTHRITIS: Status: ACTIVE | Noted: 2020-12-10

## 2020-12-10 PROBLEM — E10.65 HYPERGLYCEMIA DUE TO TYPE 1 DIABETES MELLITUS (HCC): Status: ACTIVE | Noted: 2020-12-10

## 2020-12-10 PROBLEM — K21.9 GASTROESOPHAGEAL REFLUX DISEASE WITHOUT ESOPHAGITIS: Status: ACTIVE | Noted: 2020-12-10

## 2020-12-10 PROBLEM — M25.519 SHOULDER PAIN: Status: ACTIVE | Noted: 2020-12-10

## 2020-12-10 PROCEDURE — 90471 IMMUNIZATION ADMIN: CPT | Performed by: NURSE PRACTITIONER

## 2020-12-15 ENCOUNTER — CLINICAL SUPPORT (OUTPATIENT)
Dept: PRIMARY CARE CLINIC | Age: 55
End: 2020-12-15
Payer: COMMERCIAL

## 2020-12-15 DIAGNOSIS — Z23 ENCOUNTER FOR IMMUNIZATION: Primary | ICD-10-CM

## 2020-12-15 DIAGNOSIS — Z23 NEED FOR TDAP VACCINATION: Primary | ICD-10-CM

## 2020-12-15 PROCEDURE — 90471 IMMUNIZATION ADMIN: CPT | Performed by: NURSE PRACTITIONER

## 2020-12-15 PROCEDURE — 90715 TDAP VACCINE 7 YRS/> IM: CPT | Performed by: NURSE PRACTITIONER

## 2021-03-13 DIAGNOSIS — I10 ESSENTIAL HYPERTENSION: ICD-10-CM

## 2021-03-13 DIAGNOSIS — E78.2 MIXED HYPERLIPIDEMIA: ICD-10-CM

## 2021-03-14 RX ORDER — SITAGLIPTIN AND METFORMIN HYDROCHLORIDE 50; 1000 MG/1; MG/1
TABLET, FILM COATED, EXTENDED RELEASE ORAL
Qty: 180 TAB | Refills: 1 | Status: SHIPPED | OUTPATIENT
Start: 2021-03-14 | End: 2021-09-09

## 2021-03-14 RX ORDER — SPIRONOLACTONE 25 MG/1
TABLET ORAL
Qty: 90 TAB | Refills: 1 | OUTPATIENT
Start: 2021-03-14

## 2021-03-14 RX ORDER — SIMVASTATIN 20 MG/1
TABLET, FILM COATED ORAL
Qty: 90 TAB | Refills: 1 | OUTPATIENT
Start: 2021-03-14

## 2021-03-14 NOTE — TELEPHONE ENCOUNTER
Due for 6 month appointment, please call to schedule. Can send in small amount of medications to get them to appointment if they need it. Please just let me know.

## 2021-03-22 ENCOUNTER — OFFICE VISIT (OUTPATIENT)
Dept: PRIMARY CARE CLINIC | Age: 56
End: 2021-03-22
Payer: COMMERCIAL

## 2021-03-22 VITALS
TEMPERATURE: 98.1 F | HEART RATE: 65 BPM | HEIGHT: 75 IN | WEIGHT: 315 LBS | SYSTOLIC BLOOD PRESSURE: 136 MMHG | RESPIRATION RATE: 18 BRPM | OXYGEN SATURATION: 94 % | BODY MASS INDEX: 39.17 KG/M2 | DIASTOLIC BLOOD PRESSURE: 68 MMHG

## 2021-03-22 DIAGNOSIS — E11.65 UNCONTROLLED TYPE 2 DIABETES MELLITUS WITH HYPERGLYCEMIA, WITHOUT LONG-TERM CURRENT USE OF INSULIN (HCC): Primary | Chronic | ICD-10-CM

## 2021-03-22 DIAGNOSIS — E78.2 MIXED HYPERLIPIDEMIA: Chronic | ICD-10-CM

## 2021-03-22 DIAGNOSIS — K21.9 GASTROESOPHAGEAL REFLUX DISEASE WITHOUT ESOPHAGITIS: ICD-10-CM

## 2021-03-22 DIAGNOSIS — I10 ESSENTIAL HYPERTENSION: Chronic | ICD-10-CM

## 2021-03-22 PROCEDURE — 99214 OFFICE O/P EST MOD 30 MIN: CPT | Performed by: NURSE PRACTITIONER

## 2021-03-22 RX ORDER — IRBESARTAN 150 MG/1
150 TABLET ORAL
Qty: 90 TAB | Refills: 1 | Status: SHIPPED | OUTPATIENT
Start: 2021-03-22 | End: 2021-09-09

## 2021-03-22 RX ORDER — SIMVASTATIN 20 MG/1
20 TABLET, FILM COATED ORAL
Qty: 90 TAB | Refills: 1 | Status: SHIPPED | OUTPATIENT
Start: 2021-03-22 | End: 2021-08-27

## 2021-03-22 RX ORDER — CARVEDILOL 12.5 MG/1
12.5 TABLET ORAL 2 TIMES DAILY
Qty: 180 TAB | Refills: 1 | Status: SHIPPED | OUTPATIENT
Start: 2021-03-22 | End: 2021-09-09

## 2021-03-22 RX ORDER — OMEPRAZOLE 20 MG/1
20 CAPSULE, DELAYED RELEASE ORAL DAILY
Qty: 90 CAP | Refills: 1 | Status: SHIPPED | OUTPATIENT
Start: 2021-03-22 | End: 2021-08-27

## 2021-03-22 RX ORDER — SPIRONOLACTONE 25 MG/1
25 TABLET ORAL DAILY
Qty: 90 TAB | Refills: 1 | Status: SHIPPED | OUTPATIENT
Start: 2021-03-22 | End: 2021-09-09

## 2021-03-22 NOTE — PROGRESS NOTES
HISTORY OF PRESENT ILLNESS  Bailey Worthy is a 60 y.o. male presents for medication refill. 1. Hypertension: Patients hypertension is well controlled on regimen of carvediolol, irbesartan and spironolactone. Denies headaches, blurred vision or dizziness.     2. Diabetes: Last A1c was 7.8%. This is managed by endocrinology, [place]   Next appointment with her is in due soon, had to reschedule due to death in the family. Last eye exam was May 2020.     3. Hyperlipidemia: Mixed hyperlipidemia well controlled on simvastatin.     4.Acid Reflux: Patients GERD is well controlled on current regimen of omeprazole . Patient avoids triggers such as spicy food    Vitals:    03/22/21 0814   BP: 136/68   Pulse: 65   Resp: 18   Temp: 98.1 °F (36.7 °C)   TempSrc: Oral   SpO2: 94%   Weight: 327 lb (148.3 kg)   Height: 6' 3\" (1.905 m)     Patient Active Problem List   Diagnosis Code    Obesity, morbid ([facility] E66.01    Uncontrolled type 2 diabetes mellitus with hyperglycemia, without long-term current use of insulin ([facility]) E11.65    Mixed hyperlipidemia E78.2    Essential hypertension I10    Gastroesophageal reflux disease without esophagitis K21.9    Hyperlipidemia E78.5    Osteoarthritis M19.90    Shoulder pain M25.519     Patient Active Problem List    Diagnosis Date Noted    Gastroesophageal reflux disease without esophagitis 12/10/2020    Hyperlipidemia 12/10/2020    Osteoarthritis 12/10/2020    Shoulder pain 12/10/2020    Uncontrolled type 2 diabetes mellitus with hyperglycemia, without long-term current use of insulin ([facility] 06/10/2018    Mixed hyperlipidemia 06/10/2018    Essential hypertension 06/10/2018    Obesity, morbid ([facility] 06/05/2018     Current Outpatient Medications   Medication Sig Dispense Refill    carvediloL (COREG) 12.5 mg tablet Take 1 Tab by mouth two (2) times a day. 180 Tab 1    irbesartan (AVAPRO) 150 mg tablet Take 1 Tab by mouth nightly.  90 Tab 1    simvastatin (ZOCOR) 20 mg tablet Take 1 Tab by mouth nightly. 90 Tab 1    omeprazole (PRILOSEC) 20 mg capsule Take 1 Cap by mouth daily. 90 Cap 1    spironolactone (ALDACTONE) 25 mg tablet Take 1 Tab by mouth daily. 90 Tab 1    Janumet XR 50-1,000 mg TM24 TAKE 1 TABLET BY MOUTH TWICE DAILY. STOP 100/1000 180 Tab 1    pioglitazone (ACTOS) 30 mg tablet Take 1 Tab by mouth daily. 90 Tab 4    glipiZIDE (GLUCOTROL) 10 mg tablet TAKE 1 TABLET BY MOUTH   Twenty min before b-fast and dinner 180 Tab 4    bromocriptine (Cycloset) 0.8 mg tablet Gradually increase to 5 pills everyday within 2 hours of waking up in the morning. 450 Tab 4    dulaglutide (Trulicity) 1.5 OZ/3.9 mL sub-q pen INJECT 1.5 MG UNDER THE SKIN AS DIRECTED ONCE A WEEK 12 mL 2    empagliflozin (Jardiance) 25 mg tablet TAKE 1 TABLET BY MOUTH DAILY BEFORE BREAKFAST 90 Tab 4    ascorbic acid (VITAMIN C PO) Vitamin C      alcohol swabs (Alcohol Pads) padm 1 Pad by Apply Externally route two (2) times a day. 200 Pad 3    glucose blood VI test strips (ONETOUCH VERIO) strip USE TO CHECK BLOOD SUGAR TWICE DAILY 200 Strip 4    lancets (LANCETS,ULTRA THIN) 26 gauge misc 1 Package by SubCUTAneous route two (2) times a day. USE TO TOUCH BLOOD SUGAR TWICE DAILY 300 Package 11    Blood-Glucose Meter (ONETOUCH VERIO FLEX) misc Use to check blood sugars twice daily. Dx. Code E11.65 1 Each 0    cholecalciferol, vitamin D3, (VITAMIN D3) 2,000 unit tab Take  by mouth.  KOLE ASPIRIN PO Take  by mouth.        Allergies   Allergen Reactions    Pcn [Penicillins] Hives    Pork Derived (Porcine) Other (comments)     Past Medical History:   Diagnosis Date    Allergies     Diabetes ([address]     Gastroesophageal reflux disease without esophagitis 12/10/2020    GERD (gastroesophageal reflux disease)     Hypercholesterolemia     Hyperlipidemia 12/10/2020    Hypertension     Osteoarthritis 12/10/2020    Shoulder pain 12/10/2020     Past Surgical History:   Procedure Laterality Date    HX COLONOSCOPY      HX OTHER SURGICAL      Gallstones removed     Family History   Problem Relation Age of Onset    Diabetes Mother     Hypertension Mother     Heart Disease Father     Diabetes Maternal Grandmother     Diabetes Sister     Diabetes Brother      Social History     Tobacco Use    Smoking status: Never Smoker    Smokeless tobacco: Never Used   Substance Use Topics    Alcohol use: Yes     Alcohol/week: 3.0 standard drinks     Types: 2 Cans of beer, 1 Shots of liquor per week     Frequency: Monthly or less     Drinks per session: 1 or 2     Binge frequency: Never           Review of Systems   Constitutional: Negative for malaise/fatigue and weight loss. Eyes: Negative for blurred vision, double vision and pain. Respiratory: Negative for cough and shortness of breath. Cardiovascular: Negative for chest pain, palpitations, orthopnea and leg swelling. Gastrointestinal: Negative for constipation, heartburn and nausea. Musculoskeletal: Negative for joint pain and myalgias. Skin: Negative for itching and rash. Neurological: Negative for dizziness, tingling, loss of consciousness, weakness and headaches. Endo/Heme/Allergies: Does not bruise/bleed easily. Psychiatric/Behavioral: Negative for depression. The patient is not nervous/anxious. Physical Exam  Constitutional:       Appearance: Normal appearance. He is obese. HENT:      Head: Normocephalic and atraumatic. Eyes:      Extraocular Movements: Extraocular movements intact. Conjunctiva/sclera: Conjunctivae normal.      Pupils: Pupils are equal, round, and reactive to light. Cardiovascular:      Rate and Rhythm: Normal rate and regular rhythm. Pulses: Normal pulses. Pulmonary:      Effort: Pulmonary effort is normal.      Breath sounds: Normal breath sounds. Musculoskeletal: Normal range of motion. Skin:     General: Skin is warm and dry. Neurological:      General: No focal deficit present.       Mental Status: He is alert and oriented to person, place, and time. Psychiatric:         Mood and Affect: Mood normal.         Behavior: Behavior normal.           ASSESSMENT and PLAN  Diagnoses and all orders for this visit:    1. Uncontrolled type 2 diabetes mellitus with hyperglycemia, without long-term current use of insulin ([facility]  Comments:  well controlled, last a1c was 7.8%. Managed by endocrinology. 2. Essential hypertension  Comments:  well controlled on current regimen  Orders:  -     carvediloL (COREG) 12.5 mg tablet; Take 1 Tab by mouth two (2) times a day. -     irbesartan (AVAPRO) 150 mg tablet; Take 1 Tab by mouth nightly. -     spironolactone (ALDACTONE) 25 mg tablet; Take 1 Tab by mouth daily.  -     CBC WITH AUTOMATED DIFF  -     METABOLIC PANEL, COMPREHENSIVE    3. Mixed hyperlipidemia  Comments:  stable on current medication. Orders:  -     simvastatin (ZOCOR) 20 mg tablet; Take 1 Tab by mouth nightly. -     METABOLIC PANEL, COMPREHENSIVE  -     LIPID PANEL    4. Gastroesophageal reflux disease without esophagitis  Comments:  well controlled on omeprazole  Orders:  -     omeprazole (PRILOSEC) 20 mg capsule;  Take 1 Cap by mouth daily.  -     CBC WITH AUTOMATED DIFF         Lori Garcia NP

## 2021-03-23 LAB
ALBUMIN SERPL-MCNC: 4.2 G/DL (ref 3.8–4.9)
ALBUMIN/GLOB SERPL: 1.2 {RATIO} (ref 1.2–2.2)
ALP SERPL-CCNC: 70 IU/L (ref 39–117)
ALT SERPL-CCNC: 13 IU/L (ref 0–44)
AST SERPL-CCNC: 17 IU/L (ref 0–40)
BASOPHILS # BLD AUTO: 0 X10E3/UL (ref 0–0.2)
BASOPHILS NFR BLD AUTO: 0 %
BILIRUB SERPL-MCNC: 0.7 MG/DL (ref 0–1.2)
BUN SERPL-MCNC: 13 MG/DL (ref 6–24)
BUN/CREAT SERPL: 15 (ref 9–20)
CALCIUM SERPL-MCNC: 9.5 MG/DL (ref 8.7–10.2)
CHLORIDE SERPL-SCNC: 99 MMOL/L (ref 96–106)
CHOLEST SERPL-MCNC: 133 MG/DL (ref 100–199)
CO2 SERPL-SCNC: 27 MMOL/L (ref 20–29)
CREAT SERPL-MCNC: 0.84 MG/DL (ref 0.76–1.27)
EOSINOPHIL # BLD AUTO: 0.5 X10E3/UL (ref 0–0.4)
EOSINOPHIL NFR BLD AUTO: 7 %
ERYTHROCYTE [DISTWIDTH] IN BLOOD BY AUTOMATED COUNT: 13.7 % (ref 11.6–15.4)
EST. AVERAGE GLUCOSE BLD GHB EST-MCNC: 226 MG/DL
GLOBULIN SER CALC-MCNC: 3.4 G/DL (ref 1.5–4.5)
GLUCOSE SERPL-MCNC: 206 MG/DL (ref 65–99)
HBA1C MFR BLD: 9.5 % (ref 4.8–5.6)
HCT VFR BLD AUTO: 41.6 % (ref 37.5–51)
HDLC SERPL-MCNC: 33 MG/DL
HGB BLD-MCNC: 13.9 G/DL (ref 13–17.7)
IMM GRANULOCYTES # BLD AUTO: 0 X10E3/UL (ref 0–0.1)
IMM GRANULOCYTES NFR BLD AUTO: 0 %
LDLC SERPL CALC-MCNC: 74 MG/DL (ref 0–99)
LYMPHOCYTES # BLD AUTO: 1.4 X10E3/UL (ref 0.7–3.1)
LYMPHOCYTES NFR BLD AUTO: 20 %
MCH RBC QN AUTO: 27.5 PG (ref 26.6–33)
MCHC RBC AUTO-ENTMCNC: 33.4 G/DL (ref 31.5–35.7)
MCV RBC AUTO: 82 FL (ref 79–97)
MONOCYTES # BLD AUTO: 0.6 X10E3/UL (ref 0.1–0.9)
MONOCYTES NFR BLD AUTO: 9 %
NEUTROPHILS # BLD AUTO: 4.4 X10E3/UL (ref 1.4–7)
NEUTROPHILS NFR BLD AUTO: 64 %
PLATELET # BLD AUTO: 227 X10E3/UL (ref 150–450)
POTASSIUM SERPL-SCNC: 3.9 MMOL/L (ref 3.5–5.2)
PROT SERPL-MCNC: 7.6 G/DL (ref 6–8.5)
RBC # BLD AUTO: 5.05 X10E6/UL (ref 4.14–5.8)
SODIUM SERPL-SCNC: 140 MMOL/L (ref 134–144)
TRIGL SERPL-MCNC: 150 MG/DL (ref 0–149)
VLDLC SERPL CALC-MCNC: 26 MG/DL (ref 5–40)
WBC # BLD AUTO: 6.9 X10E3/UL (ref 3.4–10.8)

## 2021-03-23 NOTE — PROGRESS NOTES
Please call the patient regarding his abnormal result. A1c is back up to 9.5%. I know he sees endocrinology for this. Make sure to take all medications as prescribed and watch carb/sugar intake. Also please reschedule his appointment with Dr. Andrew Conteh for follow up. Rest of labs look good.

## 2021-07-23 ENCOUNTER — TELEPHONE (OUTPATIENT)
Dept: ENDOCRINOLOGY | Age: 56
End: 2021-07-23

## 2021-07-23 DIAGNOSIS — I10 ESSENTIAL HYPERTENSION: ICD-10-CM

## 2021-07-23 DIAGNOSIS — E78.2 MIXED HYPERLIPIDEMIA: ICD-10-CM

## 2021-07-23 DIAGNOSIS — E11.65 UNCONTROLLED TYPE 2 DIABETES MELLITUS WITH HYPERGLYCEMIA, WITHOUT LONG-TERM CURRENT USE OF INSULIN (HCC): Primary | ICD-10-CM

## 2021-08-30 ENCOUNTER — OFFICE VISIT (OUTPATIENT)
Dept: PRIMARY CARE CLINIC | Age: 56
End: 2021-08-30
Payer: COMMERCIAL

## 2021-08-30 VITALS
RESPIRATION RATE: 18 BRPM | HEART RATE: 84 BPM | HEIGHT: 75 IN | WEIGHT: 315 LBS | TEMPERATURE: 97 F | BODY MASS INDEX: 39.17 KG/M2 | DIASTOLIC BLOOD PRESSURE: 63 MMHG | SYSTOLIC BLOOD PRESSURE: 111 MMHG | OXYGEN SATURATION: 91 %

## 2021-08-30 DIAGNOSIS — R05.8 POST-VIRAL COUGH SYNDROME: ICD-10-CM

## 2021-08-30 DIAGNOSIS — Z20.822 SUSPECTED COVID-19 VIRUS INFECTION: Primary | ICD-10-CM

## 2021-08-30 PROCEDURE — 99213 OFFICE O/P EST LOW 20 MIN: CPT | Performed by: NURSE PRACTITIONER

## 2021-08-30 RX ORDER — PROMETHAZINE HYDROCHLORIDE AND DEXTROMETHORPHAN HYDROBROMIDE 6.25; 15 MG/5ML; MG/5ML
5 SYRUP ORAL
Qty: 150 ML | Refills: 0 | Status: SHIPPED | OUTPATIENT
Start: 2021-08-30 | End: 2021-09-06

## 2021-08-30 RX ORDER — PREDNISONE 20 MG/1
60 TABLET ORAL DAILY
Qty: 9 TABLET | Refills: 0 | Status: SHIPPED | OUTPATIENT
Start: 2021-08-30 | End: 2021-09-21 | Stop reason: ALTCHOICE

## 2021-08-30 NOTE — PROGRESS NOTES
Chief Complaint   Patient presents with    Cough     Pt states having cough, SOB, sore throat. Pt this has been going on for 2 weeks. Pt states he went to urgent care and got meds and still not working. urgent care told him he has bronc. Visit Vitals  Temp 97 °F (36.1 °C) (Temporal)   Resp 18   Ht 6' 3\" (1.905 m)   Wt 327 lb (148.3 kg)   BMI 40.87 kg/m²     1. Have you been to the ER, urgent care clinic since your last visit? Hospitalized since your last visit?no    2. Have you seen or consulted any other health care providers outside of the [address] [place] [address] since your last visit? Include any pap smears or colon screening.  no

## 2021-08-30 NOTE — PROGRESS NOTES
HISTORY OF PRESENT ILLNESS  Merlinda Rolls is a 60 y.o. male presents for cough. Patient reported that he developed a sore throat, cough and headache on 8/15/21. He went to [facility] ER on 8/19 where they diagnosed him with bronchitis and he was given an inhaler and tessalon pearls. Patient reported most symptoms are resolving but he continues with a cough that is worse at night. Patient denies fevers. Did not have covid testing or CXR at the ER. Patient reported his BG has been around 150. Denies GI upset. Vitals:    08/30/21 1317   BP: 111/63   Pulse: 84   Resp: 18   Temp: 97 °F (36.1 °C)   TempSrc: Temporal   SpO2: 91%   Weight: 327 lb (148.3 kg)   Height: 6' 3\" (1.905 m)     Patient Active Problem List   Diagnosis Code    Obesity, morbid ([facility] E66.01    Uncontrolled type 2 diabetes mellitus with hyperglycemia, without long-term current use of insulin ([facility]) E11.65    Mixed hyperlipidemia E78.2    Essential hypertension I10    Gastroesophageal reflux disease without esophagitis K21.9    Hyperlipidemia E78.5    Osteoarthritis M19.90    Shoulder pain M25.519     Patient Active Problem List    Diagnosis Date Noted    Gastroesophageal reflux disease without esophagitis 12/10/2020    Hyperlipidemia 12/10/2020    Osteoarthritis 12/10/2020    Shoulder pain 12/10/2020    Uncontrolled type 2 diabetes mellitus with hyperglycemia, without long-term current use of insulin ([facility] 06/10/2018    Mixed hyperlipidemia 06/10/2018    Essential hypertension 06/10/2018    Obesity, morbid ([facility] 06/05/2018     Current Outpatient Medications   Medication Sig Dispense Refill    predniSONE (DELTASONE) 20 mg tablet Take 60 mg by mouth daily. 9 Tablet 0    promethazine-dextromethorphan (PROMETHAZINE-DM) 6.25-15 mg/5 mL syrup Take 5 mL by mouth every four (4) hours as needed for Cough for up to 7 days.  150 mL 0    simvastatin (ZOCOR) 20 mg tablet TAKE 1 TABLET BY MOUTH EVERY NIGHT 30 Tablet 0    omeprazole (PRILOSEC) 20 mg capsule TAKE 1 CAPSULE BY MOUTH DAILY 30 Capsule 0    carvediloL (COREG) 12.5 mg tablet Take 1 Tab by mouth two (2) times a day. 180 Tab 1    irbesartan (AVAPRO) 150 mg tablet Take 1 Tab by mouth nightly. 90 Tab 1    spironolactone (ALDACTONE) 25 mg tablet Take 1 Tab by mouth daily. 90 Tab 1    Janumet XR 50-1,000 mg TM24 TAKE 1 TABLET BY MOUTH TWICE DAILY. STOP 100/1000 180 Tab 1    pioglitazone (ACTOS) 30 mg tablet Take 1 Tab by mouth daily. 90 Tab 4    glipiZIDE (GLUCOTROL) 10 mg tablet TAKE 1 TABLET BY MOUTH   Twenty min before b-fast and dinner 180 Tab 4    bromocriptine (Cycloset) 0.8 mg tablet Gradually increase to 5 pills everyday within 2 hours of waking up in the morning. 450 Tab 4    dulaglutide (Trulicity) 1.5 ER/8.0 mL sub-q pen INJECT 1.5 MG UNDER THE SKIN AS DIRECTED ONCE A WEEK 12 mL 2    empagliflozin (Jardiance) 25 mg tablet TAKE 1 TABLET BY MOUTH DAILY BEFORE BREAKFAST 90 Tab 4    ascorbic acid (VITAMIN C PO) Vitamin C      alcohol swabs (Alcohol Pads) padm 1 Pad by Apply Externally route two (2) times a day. 200 Pad 3    glucose blood VI test strips (ONETOUCH VERIO) strip USE TO CHECK BLOOD SUGAR TWICE DAILY 200 Strip 4    lancets (LANCETS,ULTRA THIN) 26 gauge misc 1 Package by SubCUTAneous route two (2) times a day. USE TO TOUCH BLOOD SUGAR TWICE DAILY 300 Package 11    Blood-Glucose Meter (ONETOUCH VERIO FLEX) misc Use to check blood sugars twice daily. Dx. Code E11.65 1 Each 0    cholecalciferol, vitamin D3, (VITAMIN D3) 2,000 unit tab Take  by mouth.  KOLE ASPIRIN PO Take  by mouth.        Allergies   Allergen Reactions    Pcn [Penicillins] Hives    Pork Derived (Porcine) Other (comments)     Past Medical History:   Diagnosis Date    Allergies     Diabetes ([address]     Gastroesophageal reflux disease without esophagitis 12/10/2020    GERD (gastroesophageal reflux disease)     Hypercholesterolemia     Hyperlipidemia 12/10/2020    Hypertension     Osteoarthritis 12/10/2020    Shoulder pain 12/10/2020     Past Surgical History:   Procedure Laterality Date    HX COLONOSCOPY      HX OTHER SURGICAL      Gallstones removed     Family History   Problem Relation Age of Onset    Diabetes Mother     Hypertension Mother     Heart Disease Father     Diabetes Maternal Grandmother     Diabetes Sister     Diabetes Brother      Social History     Tobacco Use    Smoking status: Never Smoker    Smokeless tobacco: Never Used   Substance Use Topics    Alcohol use: Yes     Alcohol/week: 3.0 standard drinks     Types: 2 Cans of beer, 1 Shots of liquor per week           Review of Systems   Constitutional: Negative for chills and malaise/fatigue. HENT: Positive for sore throat. Negative for congestion, ear discharge, ear pain, sinus pain and tinnitus. Respiratory: Positive for cough. Negative for sputum production, shortness of breath and wheezing. Cardiovascular: Negative for chest pain and palpitations. Gastrointestinal: Negative. Musculoskeletal: Negative for joint pain and myalgias. Neurological: Negative for dizziness and headaches. Physical Exam  Constitutional:       Appearance: Normal appearance. He is obese. HENT:      Head: Normocephalic. Nose: Nose normal.      Mouth/Throat:      Mouth: Mucous membranes are moist.      Pharynx: Oropharynx is clear. Eyes:      Conjunctiva/sclera: Conjunctivae normal.   Cardiovascular:      Rate and Rhythm: Normal rate and regular rhythm. Pulses: Normal pulses. Heart sounds: Normal heart sounds. Pulmonary:      Effort: Pulmonary effort is normal.   Skin:     General: Skin is warm and dry. Neurological:      Mental Status: He is alert and oriented to person, place, and time. Psychiatric:         Mood and Affect: Mood normal.         Behavior: Behavior normal.           ASSESSMENT and PLAN  Diagnoses and all orders for this visit:    1.  Suspected COVID-19 virus infection  Comments:  check covid test.  Patient has been vaccinated. Orders:  -     NOVEL CORONAVIRUS (COVID-19)    2. Post-viral cough syndrome  Comments:  other symptoms have resolved. Treat with 3 days of steroids due to DM and cough syrup. Orders:  -     predniSONE (DELTASONE) 20 mg tablet; Take 60 mg by mouth daily. -     promethazine-dextromethorphan (PROMETHAZINE-DM) 6.25-15 mg/5 mL syrup; Take 5 mL by mouth every four (4) hours as needed for Cough for up to 7 days.          Osei Armenta, NP

## 2021-09-01 LAB — SARS-COV-2, NAA: DETECTED

## 2021-09-09 RX ORDER — SITAGLIPTIN AND METFORMIN HYDROCHLORIDE 50; 1000 MG/1; MG/1
TABLET, FILM COATED, EXTENDED RELEASE ORAL
Qty: 180 TABLET | Refills: 1 | Status: SHIPPED | OUTPATIENT
Start: 2021-09-09 | End: 2022-03-08

## 2021-09-21 ENCOUNTER — OFFICE VISIT (OUTPATIENT)
Dept: PRIMARY CARE CLINIC | Age: 56
End: 2021-09-21
Payer: COMMERCIAL

## 2021-09-21 VITALS
BODY MASS INDEX: 39.04 KG/M2 | RESPIRATION RATE: 18 BRPM | DIASTOLIC BLOOD PRESSURE: 65 MMHG | HEIGHT: 75 IN | WEIGHT: 314 LBS | OXYGEN SATURATION: 93 % | TEMPERATURE: 97.8 F | HEART RATE: 70 BPM | SYSTOLIC BLOOD PRESSURE: 116 MMHG

## 2021-09-21 DIAGNOSIS — I10 ESSENTIAL HYPERTENSION: Chronic | ICD-10-CM

## 2021-09-21 DIAGNOSIS — E78.2 MIXED HYPERLIPIDEMIA: Chronic | ICD-10-CM

## 2021-09-21 DIAGNOSIS — K21.9 GASTROESOPHAGEAL REFLUX DISEASE WITHOUT ESOPHAGITIS: ICD-10-CM

## 2021-09-21 DIAGNOSIS — Z23 ENCOUNTER FOR IMMUNIZATION: Primary | ICD-10-CM

## 2021-09-21 PROCEDURE — 90471 IMMUNIZATION ADMIN: CPT | Performed by: NURSE PRACTITIONER

## 2021-09-21 PROCEDURE — 99214 OFFICE O/P EST MOD 30 MIN: CPT | Performed by: NURSE PRACTITIONER

## 2021-09-21 PROCEDURE — 90756 CCIIV4 VACC ABX FREE IM: CPT | Performed by: NURSE PRACTITIONER

## 2021-09-21 RX ORDER — OMEPRAZOLE 20 MG/1
20 CAPSULE, DELAYED RELEASE ORAL DAILY
Qty: 90 CAPSULE | Refills: 1 | Status: SHIPPED | OUTPATIENT
Start: 2021-09-21 | End: 2022-11-01 | Stop reason: SDUPTHER

## 2021-09-21 RX ORDER — IRBESARTAN 150 MG/1
150 TABLET ORAL
Qty: 90 TABLET | Refills: 0 | Status: SHIPPED | OUTPATIENT
Start: 2021-09-21 | End: 2022-11-01 | Stop reason: SDUPTHER

## 2021-09-21 RX ORDER — SPIRONOLACTONE 25 MG/1
25 TABLET ORAL DAILY
Qty: 90 TABLET | Refills: 0 | Status: SHIPPED | OUTPATIENT
Start: 2021-09-21 | End: 2022-10-05 | Stop reason: SDUPTHER

## 2021-09-21 RX ORDER — CARVEDILOL 12.5 MG/1
12.5 TABLET ORAL 2 TIMES DAILY
Qty: 180 TABLET | Refills: 0 | Status: SHIPPED | OUTPATIENT
Start: 2021-09-21 | End: 2022-08-11

## 2021-09-21 RX ORDER — SIMVASTATIN 20 MG/1
20 TABLET, FILM COATED ORAL
Qty: 90 TABLET | Refills: 1 | Status: SHIPPED | OUTPATIENT
Start: 2021-09-21 | End: 2022-11-01 | Stop reason: SDUPTHER

## 2021-09-21 NOTE — PROGRESS NOTES
HISTORY OF PRESENT ILLNESS  Alma Delia Valle is a 60 y.o. male presents for chronic OV    Hypertension: Patients hypertension is well controlled on regimen of carvediolol, irbesartan and spironolactone. Denies headaches, blurred vision or dizziness.     Hyperlipidemia: Mixed hyperlipidemia well controlled on simvastatin.     Acid Reflux: Patients GERD is well controlled on current regimen of omeprazole . Patient avoids triggers such as spicy food    Diabetes: Patient sees Dr. Long Perez, next appointment is in October. Patient has been using his regimen, reported drinking more water and sugars are improving. Last a1c was 9.6%  Vitals:    09/21/21 0654   BP: 116/65   Pulse: 70   Resp: 18   Temp: 97.8 °F (36.6 °C)   TempSrc: Temporal   SpO2: 93%   Weight: 314 lb (142.4 kg)   Height: 6' 3\" (1.905 m)     Patient Active Problem List   Diagnosis Code    Obesity, morbid ([facility] E66.01    Uncontrolled type 2 diabetes mellitus with hyperglycemia, without long-term current use of insulin ([facility]) E11.65    Mixed hyperlipidemia E78.2    Essential hypertension I10    Gastroesophageal reflux disease without esophagitis K21.9    Hyperlipidemia E78.5    Osteoarthritis M19.90    Shoulder pain M25.519     Patient Active Problem List    Diagnosis Date Noted    Gastroesophageal reflux disease without esophagitis 12/10/2020    Hyperlipidemia 12/10/2020    Osteoarthritis 12/10/2020    Shoulder pain 12/10/2020    Uncontrolled type 2 diabetes mellitus with hyperglycemia, without long-term current use of insulin ([facility] 06/10/2018    Mixed hyperlipidemia 06/10/2018    Essential hypertension 06/10/2018    Ob[facility], morbid ([facility] 06/05/2018     Current Outpatient Medications   Medication Sig Dispense Refill    omeprazole (PRILOSEC) 20 mg capsule Take 1 Capsule by mouth daily. 90 Capsule 1    irbesartan (AVAPRO) 150 mg tablet Take 1 Tablet by mouth nightly.  90 Tablet 0    carvediloL (COREG) 12.5 mg tablet Take 1 Tablet by mouth two (2) times a day. 180 Tablet 0    spironolactone (ALDACTONE) 25 mg tablet Take 1 Tablet by mouth daily. 90 Tablet 0    simvastatin (ZOCOR) 20 mg tablet Take 1 Tablet by mouth nightly. 90 Tablet 1    Janumet XR 50-1,000 mg TM24 TAKE 1 TABLET BY MOUTH TWICE DAILY. STOP 100/1000 180 Tablet 1    pioglitazone (ACTOS) 30 mg tablet Take 1 Tab by mouth daily. 90 Tab 4    glipiZIDE (GLUCOTROL) 10 mg tablet TAKE 1 TABLET BY MOUTH   Twenty min before b-fast and dinner 180 Tab 4    bromocriptine (Cycloset) 0.8 mg tablet Gradually increase to 5 pills everyday within 2 hours of waking up in the morning. 450 Tab 4    dulaglutide (Trulicity) 1.5 OO/9.1 mL sub-q pen INJECT 1.5 MG UNDER THE SKIN AS DIRECTED ONCE A WEEK 12 mL 2    empagliflozin (Jardiance) 25 mg tablet TAKE 1 TABLET BY MOUTH DAILY BEFORE BREAKFAST 90 Tab 4    ascorbic acid (VITAMIN C PO) Vitamin C      glucose blood VI test strips (ONETOUCH VERIO) strip USE TO CHECK BLOOD SUGAR TWICE DAILY 200 Strip 4    lancets (LANCETS,ULTRA THIN) 26 gauge misc 1 Package by SubCUTAneous route two (2) times a day. USE TO TOUCH BLOOD SUGAR TWICE DAILY 300 Package 11    Blood-Glucose Meter (ONETOUCH VERIO FLEX) misc Use to check blood sugars twice daily. Dx. Code E11.65 1 Each 0    cholecalciferol, vitamin D3, (VITAMIN D3) 2,000 unit tab Take  by mouth.  KOLE ASPIRIN PO Take  by mouth.  alcohol swabs (Alcohol Pads) padm 1 Pad by Apply Externally route two (2) times a day.  200 Pad 3     Allergies   Allergen Reactions    Pcn [Penicillins] Hives    Pork Derived (Porcine) Other (comments)     Past Medical History:   Diagnosis Date    Allergies     Diabetes ([address]     Gastroesophageal reflux disease without esophagitis 12/10/2020    GERD (gastroesophageal reflux disease)     Hypercholesterolemia     Hyperlipidemia 12/10/2020    Hypertension     Osteoarthritis 12/10/2020    Shoulder pain 12/10/2020     Past Surgical History:   Procedure Laterality Date    HX COLONOSCOPY      HX OTHER SURGICAL      Gallstones removed     Family History   Problem Relation Age of Onset    Diabetes Mother     Hypertension Mother     Heart Disease Father     Diabetes Maternal Grandmother     Diabetes Sister     Diabetes Brother      Social History     Tobacco Use    Smoking status: Never Smoker    Smokeless tobacco: Never Used   Substance Use Topics    Alcohol use: Yes     Alcohol/week: 3.0 standard drinks     Types: 2 Cans of beer, 1 Shots of liquor per week           Review of Systems   Constitutional: Negative for malaise/fatigue and weight loss. Eyes: Negative for blurred vision and double vision. Respiratory: Negative for cough and shortness of breath. Cardiovascular: Negative for chest pain, palpitations and leg swelling. Gastrointestinal: Negative for heartburn and nausea. Musculoskeletal: Negative for joint pain and myalgias. Skin: Negative for itching and rash. Neurological: Negative for dizziness, tingling, loss of consciousness, weakness and headaches. Endo/Heme/Allergies: Does not bruise/bleed easily. Psychiatric/Behavioral: Negative for depression. The patient is not nervous/anxious. Physical Exam  Constitutional:       Appearance: Normal appearance. He is obese. HENT:      Head: Normocephalic and atraumatic. Eyes:      Extraocular Movements: Extraocular movements intact. Conjunctiva/sclera: Conjunctivae normal.      Pupils: Pupils are equal, round, and reactive to light. Cardiovascular:      Rate and Rhythm: Normal rate and regular rhythm. Pulses: Normal pulses. Pulmonary:      Effort: Pulmonary effort is normal.      Breath sounds: Normal breath sounds. Musculoskeletal:         General: Normal range of motion. Skin:     General: Skin is warm and dry. Neurological:      General: No focal deficit present. Mental Status: He is alert and oriented to person, place, and time.    Psychiatric:         Mood and Affect: Mood normal.         Behavior: Behavior normal.           ASSESSMENT and PLAN  Diagnoses and all orders for this visit:    1. Gastroesophageal reflux disease without esophagitis  Comments:  well controlled on omeprazole  Orders:  -     omeprazole (PRILOSEC) 20 mg capsule; Take 1 Capsule by mouth daily. 2. Essential hypertension  Comments:  well controlled on current regimen  Orders:  -     irbesartan (AVAPRO) 150 mg tablet; Take 1 Tablet by mouth nightly. -     carvediloL (COREG) 12.5 mg tablet; Take 1 Tablet by mouth two (2) times a day. -     spironolactone (ALDACTONE) 25 mg tablet; Take 1 Tablet by mouth daily. 3. Mixed hyperlipidemia  Comments:  stable on current medication. Orders:  -     simvastatin (ZOCOR) 20 mg tablet; Take 1 Tablet by mouth nightly.          Osei Armenta NP

## 2021-09-21 NOTE — PROGRESS NOTES
1. Have you been to the ER, urgent care clinic since your last visit? Hospitalized since your last visit?no    2. Have you seen or consulted any other health care providers outside of the [address] [place] [address] since your last visit? Include any pap smears or colon screening.  No  Visit Vitals  /65 (BP 1 Location: Right arm, BP Patient Position: At rest, BP Cuff Size: Adult)   Pulse 70   Temp 97.8 °F (36.6 °C) (Temporal)   Resp 18   Ht 6' 3\" (1.905 m)   Wt 314 lb (142.4 kg)   SpO2 93%   BMI 39.25 kg/m²       Chief Complaint   Patient presents with    Follow Up Chronic Condition     pt is asking for refills on omeprazole, zocor, irbesartan,and coreg

## 2021-10-01 DIAGNOSIS — E78.2 MIXED HYPERLIPIDEMIA: ICD-10-CM

## 2021-10-01 DIAGNOSIS — I10 ESSENTIAL HYPERTENSION: ICD-10-CM

## 2021-10-01 DIAGNOSIS — E11.65 UNCONTROLLED TYPE 2 DIABETES MELLITUS WITH HYPERGLYCEMIA, WITHOUT LONG-TERM CURRENT USE OF INSULIN (HCC): Primary | ICD-10-CM

## 2021-10-02 LAB
EST. AVERAGE GLUCOSE BLD GHB EST-MCNC: 260 MG/DL
HBA1C MFR BLD: 10.7 % (ref 4–5.6)

## 2021-10-10 DIAGNOSIS — E11.65 UNCONTROLLED TYPE 2 DIABETES MELLITUS WITH HYPERGLYCEMIA, WITHOUT LONG-TERM CURRENT USE OF INSULIN (HCC): ICD-10-CM

## 2021-10-10 RX ORDER — DULAGLUTIDE 1.5 MG/.5ML
INJECTION, SOLUTION SUBCUTANEOUS
Qty: 12 ML | Refills: 2 | Status: SHIPPED | OUTPATIENT
Start: 2021-10-10 | End: 2022-01-14 | Stop reason: SDUPTHER

## 2021-11-29 ENCOUNTER — NURSE TRIAGE (OUTPATIENT)
Dept: OTHER | Facility: CLINIC | Age: 56
End: 2021-11-29

## 2021-11-29 NOTE — TELEPHONE ENCOUNTER
Received call from [address] at [facility] with Red Flag Complaint. Brief description of triage: Pt called with concern of lingering cough, sob with exertion after having Covid-19 pneumonia a few months ago. Triage indicates for patient to PCP within 3 days. Care advice provided, patient verbalizes understanding; denies any other questions or concerns; instructed to call back for any new or worsening symptoms. Writer provided warm transfer to [facility] at [facility] for appointment scheduling. Attention Provider: Thank you for allowing me to participate in the care of your patient. The patient was connected to triage in response to information provided to the [facility]. Please do not respond through this encounter as the response is not directed to a shared pool. Reason for Disposition   [1] PERSISTING SYMPTOMS OF COVID-19 AND [2] NO medical visit for COVID-19 in past 2 weeks    Answer Assessment - Initial Assessment Questions  1. COVID-19 ONSET: \"When did the symptoms of COVID-19 first start? \"      October 2021    2. DIAGNOSIS CONFIRMATION: \"How were you diagnosed? \" (e.g., COVID-19 oral or nasal viral test; COVID-19 antibody test; doctor visit)      Viral test @ PCP visit    3. MAIN SYMPTOM:  Sommer Redd is your main concern or symptom right now? \" (e.g., breathing difficulty, cough, fatigue. loss of smell)      Cough, SOB with increased exertion    4. SYMPTOM ONSET: \"When did the  SOB/cough  start? \"      Never got rid of cough completely    5. BETTER-SAME-WORSE: Jeffery Troy you getting better, staying the same, or getting worse over the last 1 to 2 weeks? \"      Staying the same- wife is concerned that cough has not resolved. 6. RECENT MEDICAL VISIT: Artur Sparks you been seen by a healthcare provider (doctor, NP, PA) for these persisting COVID-19 symptoms? \" If Yes, ask: \"When were you seen? \" (e.g., date)      Last in October- had Covid pneumonia    7. COUGH: \"Do you have a cough? \" If Yes, ask: \"How bad is the cough? \" Occasional    8. FEVER: \"Do you have a fever? \" If Yes, ask: \"What is your temperature, how was it measured, and when did it start? \"      Denies    9. BREATHING DIFFICULTY: Lis Cordial you having any trouble breathing? \" If Yes, ask: \"How bad is your breathing? \" (e.g., mild, moderate, severe)     - MILD: No SOB at rest, mild SOB with walking, speaks normally in sentences, can lay down, no retractions, pulse < 100.     - MODERATE: SOB at rest, SOB with minimal exertion and prefers to sit, cannot lie down flat, speaks in phrases, mild retractions, audible wheezing, pulse 100-120.     - SEVERE: Very SOB at rest, speaks in single words, struggling to breathe, sitting hunched forward, retractions, pulse > 120       Mild    10. HIGH RISK DISEASE: \"Do you have any chronic medical problems? \" (e.g., asthma, heart or lung disease, weak immune system, obesity, etc.)        HTN    11. PREGNANCY: \"Is there any chance you are pregnant? \" \"When was your last menstrual period? \"        N/a male    15. OTHER SYMPTOMS: \"Do you have any other symptoms? \"  (e.g., fatigue, headache, muscle pain, weakness)        Denies    Protocols used: COVID-19 - PERSISTING SYMPTOMS FOLLOW-UP CALL-ADULT-[place]

## 2021-12-02 ENCOUNTER — VIRTUAL VISIT (OUTPATIENT)
Dept: PRIMARY CARE CLINIC | Age: 56
End: 2021-12-02
Payer: COMMERCIAL

## 2021-12-02 DIAGNOSIS — R05.8 POST-VIRAL COUGH SYNDROME: Primary | ICD-10-CM

## 2021-12-02 PROCEDURE — 99213 OFFICE O/P EST LOW 20 MIN: CPT | Performed by: NURSE PRACTITIONER

## 2021-12-02 RX ORDER — BENZONATATE 200 MG/1
200 CAPSULE ORAL
Qty: 90 CAPSULE | Refills: 0 | Status: SHIPPED | OUTPATIENT
Start: 2021-12-02 | End: 2022-04-05 | Stop reason: ALTCHOICE

## 2021-12-02 RX ORDER — ALBUTEROL SULFATE 90 UG/1
AEROSOL, METERED RESPIRATORY (INHALATION)
COMMUNITY
Start: 2021-10-20

## 2021-12-02 RX ORDER — BUDESONIDE AND FORMOTEROL FUMARATE DIHYDRATE 80; 4.5 UG/1; UG/1
2 AEROSOL RESPIRATORY (INHALATION) 2 TIMES DAILY
Qty: 10.2 G | Refills: 5 | Status: SHIPPED | OUTPATIENT
Start: 2021-12-02 | End: 2022-01-24

## 2021-12-02 RX ORDER — INSULIN LISPRO 100 [IU]/ML
INJECTION, SOLUTION INTRAVENOUS; SUBCUTANEOUS
COMMUNITY
Start: 2021-11-29 | End: 2022-01-24

## 2021-12-02 NOTE — PROGRESS NOTES
Chief Complaint   Patient presents with    Concern For COVID-19 (Coronavirus)     Pt states testing positive to covid in august. Pt states he also had bron, and pnemonia. Pt states getting antibiotics and they helped but patient states still havinglingering cough       1. Have you been to the ER, urgent care clinic since your last visit? Hospitalized since your last visit? yes for covid     2. Have you seen or consulted any other health care providers outside of the [address] [place] [address] since your last visit? Include any pap smears or colon screening. no    There were no vitals taken for this visit.

## 2021-12-02 NOTE — PROGRESS NOTES
HISTORY OF PRESENT ILLNESS  Elsie Pierson is a 60 y.o. male presents via telemedicine for cough    Patient notes that he has was dx with covid in August.  Patient reported that he continued to have breathing issues. Diagnosed with covid pneumonia and treated with 10 day course of antibiotics(doxycycline), steroids and cough medication. Patient notes that symptoms improved except he continues to have a cough and SOB with exertion (walking more then 20 steps). Patient notes that he was told by a pulmonologist that he had scarring on his lungs from the infection and it would take a while for his lungs improve. He has a follow up CT scan in 6 months at [facility]. Most concerning symptoms is dry cough in the morning. Has used albuterol intermittently with mild relief. Patient is a diabetic and notes that his sugars have been elevated due to all the steroid he has been on. There were no vitals filed for this visit.   Patient Active Problem List   Diagnosis Code    Obesity, morbid ([address] E66.01    Uncontrolled type 2 diabetes mellitus with hyperglycemia, without long-term current use of insulin ([address] E11.65    Mixed hyperlipidemia E78.2    Essential hypertension I10    Gastroesophageal reflux disease without esophagitis K21.9    Hyperlipidemia E78.5    Osteoarthritis M19.90    Shoulder pain M25.519     Patient Active Problem List    Diagnosis Date Noted    Gastroesophageal reflux disease without esophagitis 12/10/2020    Hyperlipidemia 12/10/2020    Osteoarthritis 12/10/2020    Shoulder pain 12/10/2020    Uncontrolled type 2 diabetes mellitus with hyperglycemia, without long-term current use of insulin ([address] 06/10/2018    Mixed hyperlipidemia 06/10/2018    Essential hypertension 06/10/2018    Obesity, morbid ([address] 06/05/2018     Current Outpatient Medications   Medication Sig Dispense Refill    budesonide-formoteroL (SYMBICORT) 80-4.5 mcg/actuation HFAA Take 2 Puffs by inhalation two (2) times a day. 10.2 g 5    benzonatate (TESSALON) 200 mg capsule Take 1 Capsule by mouth three (3) times daily as needed for Cough. 90 Capsule 0    Trulicity 1.5 VG/9.4 mL sub-q pen INJECT 1.5 MG UNDER THE SKIN AS DIRECTED ONCE A WEEK 12 mL 2    omeprazole (PRILOSEC) 20 mg capsule Take 1 Capsule by mouth daily. 90 Capsule 1    irbesartan (AVAPRO) 150 mg tablet Take 1 Tablet by mouth nightly. 90 Tablet 0    carvediloL (COREG) 12.5 mg tablet Take 1 Tablet by mouth two (2) times a day. 180 Tablet 0    spironolactone (ALDACTONE) 25 mg tablet Take 1 Tablet by mouth daily. 90 Tablet 0    simvastatin (ZOCOR) 20 mg tablet Take 1 Tablet by mouth nightly. 90 Tablet 1    Janumet XR 50-1,000 mg TM24 TAKE 1 TABLET BY MOUTH TWICE DAILY. STOP 100/1000 180 Tablet 1    pioglitazone (ACTOS) 30 mg tablet Take 1 Tab by mouth daily. 90 Tab 4    glipiZIDE (GLUCOTROL) 10 mg tablet TAKE 1 TABLET BY MOUTH   Twenty min before b-fast and dinner 180 Tab 4    bromocriptine (Cycloset) 0.8 mg tablet Gradually increase to 5 pills everyday within 2 hours of waking up in the morning. 450 Tab 4    empagliflozin (Jardiance) 25 mg tablet TAKE 1 TABLET BY MOUTH DAILY BEFORE BREAKFAST 90 Tab 4    ascorbic acid (VITAMIN C PO) Vitamin C      alcohol swabs (Alcohol Pads) padm 1 Pad by Apply Externally route two (2) times a day. 200 Pad 3    glucose blood VI test strips (ONETOUCH VERIO) strip USE TO CHECK BLOOD SUGAR TWICE DAILY 200 Strip 4    lancets (LANCETS,ULTRA THIN) 26 gauge misc 1 Package by SubCUTAneous route two (2) times a day. USE TO TOUCH BLOOD SUGAR TWICE DAILY 300 Package 11    Blood-Glucose Meter (ONETOUCH VERIO FLEX) misc Use to check blood sugars twice daily. Dx. Code E11.65 1 Each 0    cholecalciferol, vitamin D3, (VITAMIN D3) 2,000 unit tab Take  by mouth.  KOLE ASPIRIN PO Take  by mouth.       albuterol (PROVENTIL HFA, VENTOLIN HFA, PROAIR HFA) 90 mcg/actuation inhaler       HumaLOG KwikPen Insulin 100 unit/mL kwikpen Allergies   Allergen Reactions    Pcn [Penicillins] Hives    Pork Derived (Porcine) Other (comments)     Past Medical History:   Diagnosis Date    Allergies     Diabetes ([address]     Gastroesophageal reflux disease without esophagitis 12/10/2020    GERD (gastroesophageal reflux disease)     Hypercholesterolemia     Hyperlipidemia 12/10/2020    Hypertension     Osteoarthritis 12/10/2020    Shoulder pain 12/10/2020     Past Surgical History:   Procedure Laterality Date    HX COLONOSCOPY      HX OTHER SURGICAL      Gallstones removed     Family History   Problem Relation Age of Onset    Diabetes Mother     Hypertension Mother     Heart Disease Father     Diabetes Maternal Grandmother     Diabetes Sister     Diabetes Brother      Social History     Tobacco Use    Smoking status: Never Smoker    Smokeless tobacco: Never Used   Substance Use Topics    Alcohol use: Yes     Alcohol/week: 3.0 standard drinks     Types: 2 Cans of beer, 1 Shots of liquor per week           Review of Systems   Constitutional: Negative for chills and fever. HENT: Negative. Respiratory: Positive for cough and shortness of breath. Negative for sputum production and wheezing. Cardiovascular: Negative for chest pain and palpitations. Gastrointestinal: Negative. Physical Exam  Constitutional:       Appearance: Normal appearance. He is obese. HENT:      Head: Normocephalic. Eyes:      Conjunctiva/sclera: Conjunctivae normal.   Pulmonary:      Effort: Pulmonary effort is normal.   Skin:     General: Skin is warm and dry. Neurological:      Mental Status: He is alert and oriented to person, place, and time. Psychiatric:         Mood and Affect: Mood normal.         Behavior: Behavior normal.           ASSESSMENT and PLAN  Diagnoses and all orders for this visit:    1. Post-viral cough syndrome  Comments:  try daily symbicort and benzonatate caps for cough.   Following with pulmonology, has follow up CT scan ordered in 6 months  Orders:  -     budesonide-formoteroL (SYMBICORT) 80-4.5 mcg/actuation HFAA; Take 2 Puffs by inhalation two (2) times a day. -     benzonatate (TESSALON) 200 mg capsule; Take 1 Capsule by mouth three (3) times daily as needed for Cough. Dieudonne Coates, who was evaluated through a synchronous (real-time) audio-video encounter, and/or his healthcare decision maker, is aware that it is a billable service, with coverage as determined by his insurance carrier. He provided verbal consent to proceed: Yes, and patient identification was verified. It was conducted pursuant to the emergency declaration under the [address] [place] [address] and the [employer] Act. A caregiver was present when appropriate. Ability to conduct physical exam was limited. I was at home. The patient was at home. Dieudonne Coates is a 60 y.o. male being evaluated by a Virtual Visit (video visit) encounter to address concerns as mentioned above. A caregiver was present when appropriate. Due to this being a TeleHealth encounter ([facility]), evaluation of the following organ systems was limited: Vitals/Constitutional/EENT/Resp/CV/GI//MS/Neuro/Skin/Heme-Lymph-Imm. Pursuant to the emergency declaration under the [address] [place] [address] and the [employer] Act, this Virtual Visit was conducted with patient's (and/or legal guardian's) consent, to reduce the risk of exposure to COVID-19 and provide necessary medical care. Services were provided through a video synchronous discussion virtually to substitute for in-person encounter. --Evelyn Oliveira NP on 12/2/2021 at 7:58 AM    An electronic signature was used to authenticate this note.

## 2021-12-08 DIAGNOSIS — E11.65 UNCONTROLLED TYPE 2 DIABETES MELLITUS WITH HYPERGLYCEMIA, WITHOUT LONG-TERM CURRENT USE OF INSULIN (HCC): ICD-10-CM

## 2021-12-10 RX ORDER — BROMOCRIPTINE MESYLATE 0.8 MG/1
TABLET ORAL
Qty: 450 TABLET | Refills: 4 | Status: SHIPPED | OUTPATIENT
Start: 2021-12-10

## 2021-12-10 RX ORDER — GLIPIZIDE 10 MG/1
TABLET ORAL
Qty: 180 TABLET | Refills: 4 | Status: SHIPPED | OUTPATIENT
Start: 2021-12-10

## 2022-01-12 LAB
EST. AVERAGE GLUCOSE BLD GHB EST-MCNC: 171 MG/DL
HBA1C MFR BLD: 7.6 % (ref 4–5.6)

## 2022-01-14 DIAGNOSIS — E11.65 UNCONTROLLED TYPE 2 DIABETES MELLITUS WITH HYPERGLYCEMIA, WITHOUT LONG-TERM CURRENT USE OF INSULIN (HCC): ICD-10-CM

## 2022-01-14 RX ORDER — DULAGLUTIDE 1.5 MG/.5ML
INJECTION, SOLUTION SUBCUTANEOUS
Qty: 6 ML | Refills: 2 | Status: SHIPPED | OUTPATIENT
Start: 2022-01-14

## 2022-01-24 ENCOUNTER — OFFICE VISIT (OUTPATIENT)
Dept: ENDOCRINOLOGY | Age: 57
End: 2022-01-24
Payer: COMMERCIAL

## 2022-01-24 VITALS
DIASTOLIC BLOOD PRESSURE: 76 MMHG | BODY MASS INDEX: 39.17 KG/M2 | HEIGHT: 75 IN | WEIGHT: 315 LBS | OXYGEN SATURATION: 96 % | TEMPERATURE: 97.6 F | HEART RATE: 66 BPM | SYSTOLIC BLOOD PRESSURE: 135 MMHG

## 2022-01-24 DIAGNOSIS — E11.65 UNCONTROLLED TYPE 2 DIABETES MELLITUS WITH HYPERGLYCEMIA, WITHOUT LONG-TERM CURRENT USE OF INSULIN (HCC): Primary | ICD-10-CM

## 2022-01-24 DIAGNOSIS — I10 ESSENTIAL HYPERTENSION: ICD-10-CM

## 2022-01-24 DIAGNOSIS — E78.2 MIXED HYPERLIPIDEMIA: ICD-10-CM

## 2022-01-24 PROCEDURE — 3051F HG A1C>EQUAL 7.0%<8.0%: CPT | Performed by: INTERNAL MEDICINE

## 2022-01-24 PROCEDURE — 99214 OFFICE O/P EST MOD 30 MIN: CPT | Performed by: INTERNAL MEDICINE

## 2022-01-24 NOTE — PATIENT INSTRUCTIONS
SPECIFIC INSTRUCTIONS BELOW       Check blood sugars only twice a day by rotation as follows    First day - before b-fast and - before lunch  Second day- before dinner and  before bed time    After 2 days go back to same rotation          Do not skip meals  Do not eat in between meals    Reduce carbs- pasta, rice, potatoes, bread   Do not drink juices or sodas  Donot eat peanut butter     Do not eat sugar free cookies and cakes   Do not eat peaches, grapes, oranges, pineapples and fruit medleys              ----------------------------------------------------------------------------------------------------------------------------------------------------------------------------------     glipizide TO 10  mg twice a day, twenty min before b-fast and dinner   (Do not take it if you are not eating   Cut the pill to half if eating lesser than normal   Do not avoid lunches )     cycloset  0.8 mg To    5 pills   EVERY DAY  within 2 hours of getting up in the morning     janumet xr 50 /1000 mg twice a day with meals     StaY   jardiance 25   Mg , right before   b-fast    ( drink plenty of water )    Stay on Trulicity at 1.5 mg a week             -------------PAY ATTENTION TO THESE GENERAL INSTRUCTIONS -----------------      - The medications prescribed at this visit will not be available at pharmacy until 6 pm       - YOUR MED LIST IS NOT UP TO DATE AS SOME CHANGES ARE BEING MADE AFTER THE VISIT - FOLLOW SPECIFIC INSTRUCTIONS  ABOVE     -ANY tests other than blood work, which you opt to do  outside the  [facility] facilities, you are responsible for prior authorizations if  required    - [address] [facility] AVS- PLEASE IGNORE     Results     *Normal results will not be notified by a phone call starting January 1 2021   *If you have an upcoming visit, the results will be discussed at the visit   *Please sign up for MY CHART if you want access to your lab and test results  *Abnormal results which require immediate attention will be notified by phone call   *Abnormal results which do not require immediate assistance will be notified in 1-2 weeks       Refills    -    have your pharmacy send us a refill request . Refills are done max for one year and a visit is a must before refills are extended    Follow up appointments -  highly encourage you to make it when you are checking out. We can accommodate you into the schedule based on your clinical situation, but not for extending refills beyond a year. Labs are important to give refills and is important to get labs before the visit     Phone calls  -  Allow  24 hrs.  for non-urgent calls to be returned  Prior authorization - It may take 2-4 weeks to process  Forms  -  FMLA, DMV etc., will take up to 2 weeks to process  Cancellations - please notify the office 2 days in advance   Samples  - will only be dispensed at visits       If not showing for the appointments and cancelling appointments within 24 hours are kept track of and three  of such situations in  two consecutive years will likely be considered for termination from the practice    -------------------------------------------------------------------------------------------------------------------

## 2022-01-24 NOTE — PROGRESS NOTES
Anthony Diallo is a 60 y.o. male here for   Chief Complaint   Patient presents with    Diabetes       1. Have you been to the ER, urgent care clinic since your last visit? Hospitalized since your last visit? - yes [facility] ER (Aug, Oct) for COVID    2. Have you seen or consulted any other health care providers outside of the [address] [place] [address] since your last visit? Include any pap smears or colon screening. -PCP

## 2022-01-24 NOTE — LETTER
1/24/2022    Patient: Suzy Worrell   YOB: 1965   Date of Visit: 1/24/2022     Keli Alexander NP  [place] [phone]  [address] [place]    Dear Keli Alexander NP,      Thank you for referring Mr. Allison Bonilla to [address] [place] [address] for evaluation. My notes for this consultation are attached. If you have questions, please do not hesitate to call me. I look forward to following your patient along with you.       Sincerely,    Breann Rivera MD

## 2022-01-24 NOTE — PROGRESS NOTES
HISTORY OF PRESENT ILLNESS  Trinh Hernandez is a 60 y.o. male. Patient here for   f/u after last  visit of Type 2 diabetes mellitus  From Nov 2020     A long gap of 1.5 years almost   Lost many of relatives to [place]             Nov 2020    Did not bring med bottles   He got the  meter       Jan 2020     Gained 2 lbs   Eating right   Walking   Checking sugars     Old history :     Pt is staying busy   No meter is brought    ? Diet compliance is not sure       Old history :      COULD NOT F/U SOONER   GOT BUSY     CONTINUED TO F/U WITH PCP   GLYBURIDE WAS RESTARTED         Old history :  Referred : by self/pcp    H/o diabetes for many years     Current A1C is 11.6 %  From feb 2018  and symptoms/problems include polyuria, polydipsia and visual disturbances   Current diabetic medications include intensive insulin injection program.   Current monitoring regimen: home blood tests - 2 times daily  Home blood sugar records: trend: fluctuating a lot  Any episodes of hypoglycemia? yes - multiple      Review of Systems   Psychiatric/Behavioral: Negative for depression and memory loss. The patient does not have insomnia. Physical Exam   Constitutional: He is oriented to person, place, and time. He appears well-developed and well-nourished. EDENTULOUS   Psychiatric: He has a normal mood and affect.        Lab Results   Component Value Date/Time    Hemoglobin A1c 7.6 (H) 01/11/2022 11:00 AM    Hemoglobin A1c 10.7 (H) 10/01/2021 02:52 PM    Hemoglobin A1c 9.7 (H) 07/23/2021 09:38 AM    Hemoglobin A1c, External 11.6 02/21/2018 12:00 AM    Glucose 145 (H) 10/01/2021 02:52 PM    Glucose  06/05/2018 04:00 PM    Microalbumin/Creat ratio (mg/g creat) 9 10/01/2021 02:52 PM    Microalbumin,urine random 0.51 10/01/2021 02:52 PM    LDL,Direct 98 07/23/2021 09:38 AM    LDL, calculated 56.6 10/01/2021 02:52 PM    Creatinine 0.87 10/01/2021 02:52 PM      Lab Results   Component Value Date/Time    Cholesterol, total 115 10/01/2021 02:52 PM    HDL Cholesterol 28 10/01/2021 02:52 PM    LDL,Direct 98 07/23/2021 09:38 AM    LDL, calculated 56.6 10/01/2021 02:52 PM    Triglyceride 152 (H) 10/01/2021 02:52 PM    CHOL/HDL Ratio 4.1 10/01/2021 02:52 PM     Lab Results   Component Value Date/Time    ALT (SGPT) 17 10/01/2021 02:52 PM    Alk. phosphatase 69 10/01/2021 02:52 PM    Bilirubin, total 0.7 10/01/2021 02:52 PM    Albumin 3.5 10/01/2021 02:52 PM    Protein, total 7.6 10/01/2021 02:52 PM    PLATELET 103 48/45/6581 08:49 AM     Lab Results   Component Value Date/Time    GFR est non-AA >60 10/01/2021 02:52 PM    GFR est AA >60 10/01/2021 02:52 PM    Creatinine 0.87 10/01/2021 02:52 PM    BUN 18 10/01/2021 02:52 PM    Sodium 139 10/01/2021 02:52 PM    Potassium 3.8 10/01/2021 02:52 PM    Chloride 106 10/01/2021 02:52 PM    CO2 25 10/01/2021 02:52 PM           ASSESSMENT and PLAN    1.  Type 2 DM poorly  controlled : a1c is  7.6%     From     Jan 2022    Compared to   Over 10   %     From   October 2021  ( STEROID USE )   compared to    7.8 % from   Today by  POC   Compared to  9.1 %     From    Jan 2020    compared to   10.9 %      From      By POC,   Oct 2019    Compared to     12.2 %     From  August 2019    Compared to       ??    9.4 %  From august 2018      COMPARED TO    9.7 %   From June 2018    Compared to over 11 %  From feb 2018       maintained  Control    Stay on glipizide, janumet xr, trulicity , jardiance , cycloset   He is very sure that he is NOT  taking  Pioglitazone      Nov 2020     improvement in CONTROL    meter is brought   Stay on glipizide, janumet xr, trulicity , jardiance and cycloset   He is unsure of actos being taken       Jan 2020    He has NOT been taking GLIPIZIDE ( PRESCRIPTION WAS SENT IN [facility] 2019 WITH ALLTHE OTHER MEDS BUT PHARMACY CLAIMS THAT IT DID NTO RECEIVE IT ) oct 2019   INCREASed  glipizide  TO 10 mg twice a day, twenty min before b-fast and dinner     CONTINUE ON    cycloset  0.8 mg  3 pills DAILY  within 2 hours of getting up in the morning   janumet xr 50 /1000 mg twice a day with meals    jardiance 25   Mg , right before   b-fast    ( drink plenty of water )  Stay on Trulicity at 1.5 mg a week   Started on  [address] but he is on [employer] license         2. Hypoglycemia :  Educated on treating the hypoglycemia. 3. HTN : continue SPIRONOLACTONE  25 MG A DAY  AND IRBESARTAN 150 MG A DAY     4.  Dyslipidemia : continue SIMVASTATIN 20 MG . 5. use of aspirin to prevent MI and TIA's discussed      6. Obesity : Body mass index is 40.02 kg/m². Discussed TLC and started on inj incretins      7.  HE F/U WITH [facility]  BUT PT HAS NO KIDNEY ISSUES       Reviewed results with patient and discussed the labs being ordered today/bnv  Patient voiced understanding of plan of care

## 2022-03-08 RX ORDER — SITAGLIPTIN AND METFORMIN HYDROCHLORIDE 50; 1000 MG/1; MG/1
TABLET, FILM COATED, EXTENDED RELEASE ORAL
Qty: 180 TABLET | Refills: 1 | Status: SHIPPED | OUTPATIENT
Start: 2022-03-08 | End: 2022-09-05

## 2022-03-18 PROBLEM — E11.65 UNCONTROLLED TYPE 2 DIABETES MELLITUS WITH HYPERGLYCEMIA, WITHOUT LONG-TERM CURRENT USE OF INSULIN (HCC): Status: ACTIVE | Noted: 2018-06-10

## 2022-03-19 PROBLEM — E66.01 OBESITY, MORBID (HCC): Status: ACTIVE | Noted: 2018-06-05

## 2022-03-19 PROBLEM — M25.519 SHOULDER PAIN: Status: ACTIVE | Noted: 2020-12-10

## 2022-03-19 PROBLEM — I10 ESSENTIAL HYPERTENSION: Status: ACTIVE | Noted: 2018-06-10

## 2022-03-19 PROBLEM — E78.5 HYPERLIPIDEMIA: Status: ACTIVE | Noted: 2020-12-10

## 2022-03-19 PROBLEM — M19.90 OSTEOARTHRITIS: Status: ACTIVE | Noted: 2020-12-10

## 2022-03-20 PROBLEM — E78.2 MIXED HYPERLIPIDEMIA: Status: ACTIVE | Noted: 2018-06-10

## 2022-03-20 PROBLEM — K21.9 GASTROESOPHAGEAL REFLUX DISEASE WITHOUT ESOPHAGITIS: Status: ACTIVE | Noted: 2020-12-10

## 2022-04-05 ENCOUNTER — OFFICE VISIT (OUTPATIENT)
Dept: PRIMARY CARE CLINIC | Age: 57
End: 2022-04-05
Payer: COMMERCIAL

## 2022-04-05 VITALS
SYSTOLIC BLOOD PRESSURE: 125 MMHG | BODY MASS INDEX: 39.17 KG/M2 | OXYGEN SATURATION: 93 % | HEIGHT: 75 IN | DIASTOLIC BLOOD PRESSURE: 77 MMHG | HEART RATE: 62 BPM | RESPIRATION RATE: 20 BRPM | WEIGHT: 315 LBS | TEMPERATURE: 97.1 F

## 2022-04-05 DIAGNOSIS — S46.212A STRAIN OF LEFT BICEPS MUSCLE, INITIAL ENCOUNTER: Primary | ICD-10-CM

## 2022-04-05 DIAGNOSIS — E66.01 OBESITY, CLASS III, BMI 40-49.9 (MORBID OBESITY) (HCC): ICD-10-CM

## 2022-04-05 DIAGNOSIS — E11.65 UNCONTROLLED TYPE 2 DIABETES MELLITUS WITH HYPERGLYCEMIA, WITHOUT LONG-TERM CURRENT USE OF INSULIN (HCC): ICD-10-CM

## 2022-04-05 PROCEDURE — 3051F HG A1C>EQUAL 7.0%<8.0%: CPT | Performed by: NURSE PRACTITIONER

## 2022-04-05 PROCEDURE — 99213 OFFICE O/P EST LOW 20 MIN: CPT | Performed by: NURSE PRACTITIONER

## 2022-04-05 RX ORDER — ISOPROPYL ALCOHOL 70 ML/100ML
1 SWAB TOPICAL 2 TIMES DAILY
Qty: 200 PAD | Refills: 3 | Status: SHIPPED | OUTPATIENT
Start: 2022-04-05

## 2022-04-05 RX ORDER — NAPROXEN 500 MG/1
500 TABLET ORAL 2 TIMES DAILY WITH MEALS
Qty: 20 TABLET | Refills: 0 | Status: SHIPPED | OUTPATIENT
Start: 2022-04-05 | End: 2022-10-05

## 2022-04-05 NOTE — PROGRESS NOTES
HISTORY OF PRESENT ILLNESS  Omi Gardner is a 60 y.o. male presents for arm pain. Patient notes that he had his covid booster on 2/18 and felt soreness to left arm. Patient reported he has continued with intermittent soreness to left arm since the vaccination. Notes that it will stay sore for 3-4 days then is will ease off. Patient notes that he uses fritz pain relief with relief to soreness but then it will eventually return. Patient also notes pain in muscle when he lifts his arm up. Denies injury. Vitals:    04/05/22 1025 04/05/22 1030   BP: (!) 141/80 125/77   Pulse: 88 62   Resp: 20    Temp: 97.1 °F (36.2 °C)    TempSrc: Temporal    SpO2: 93%    Weight: 323 lb 6.4 oz (146.7 kg)    Height: 6' 3\" (1.905 m)      Patient Active Problem List   Diagnosis Code    Obesity, morbid ([facility] E66.01    Uncontrolled type 2 diabetes mellitus with hyperglycemia, without long-term current use of insulin ([facility]) E11.65    Mixed hyperlipidemia E78.2    Essential hypertension I10    Gastroesophageal reflux disease without esophagitis K21.9    Hyperlipidemia E78.5    Osteoarthritis M19.90    Shoulder pain M25.519     Patient Active Problem List    Diagnosis Date Noted    Gastroesophageal reflux disease without esophagitis 12/10/2020    Hyperlipidemia 12/10/2020    Osteoarthritis 12/10/2020    Shoulder pain 12/10/2020    Uncontrolled type 2 diabetes mellitus with hyperglycemia, without long-term current use of insulin ([facility] 06/10/2018    Mixed hyperlipidemia 06/10/2018    Essential hypertension 06/10/2018    Ob[facility], morbid ([facility] 06/05/2018     Current Outpatient Medications   Medication Sig Dispense Refill    alcohol swabs (Alcohol Pads) padm 1 Pad by Apply Externally route two (2) times a day. 200 Pad 3    naproxen (NAPROSYN) 500 mg tablet Take 1 Tablet by mouth two (2) times daily (with meals). 20 Tablet 0    Janumet XR 50-1,000 mg TM24 TAKE 1 TABLET BY MOUTH TWICE DAILY.  STOP 100/1000 180 Tablet 1    dulaglutide (Trulicity) 1.5 KI/1.3 mL sub-q pen 1.5 mg a week 6 mL 2    empagliflozin (Jardiance) 25 mg tablet TAKE 1 TABLET BY MOUTH DAILY BEFORE BREAKFAST 90 Tablet 4    bromocriptine (Cycloset) 0.8 mg tablet GRADUALLY INCREASE TO 5 TABLETS BY MOUTH WITHIN 2 HOURS OF WAKING UP IN THE MORNING 450 Tablet 4    glipiZIDE (GLUCOTROL) 10 mg tablet TAKE 1 TABLET BY MOUTH 20 MINUTES BEFORE BREAKFAST AND 1 TABLET 20 MINUTES BEFORE DINNER 180 Tablet 4    albuterol (PROVENTIL HFA, VENTOLIN HFA, PROAIR HFA) 90 mcg/actuation inhaler       omeprazole (PRILOSEC) 20 mg capsule Take 1 Capsule by mouth daily. 90 Capsule 1    irbesartan (AVAPRO) 150 mg tablet Take 1 Tablet by mouth nightly. 90 Tablet 0    carvediloL (COREG) 12.5 mg tablet Take 1 Tablet by mouth two (2) times a day. 180 Tablet 0    spironolactone (ALDACTONE) 25 mg tablet Take 1 Tablet by mouth daily. 90 Tablet 0    simvastatin (ZOCOR) 20 mg tablet Take 1 Tablet by mouth nightly. 90 Tablet 1    ascorbic acid (VITAMIN C PO) Vitamin C      glucose blood VI test strips (ONETOUCH VERIO) strip USE TO CHECK BLOOD SUGAR TWICE DAILY 200 Strip 4    lancets (LANCETS,ULTRA THIN) 26 gauge misc 1 Package by SubCUTAneous route two (2) times a day. USE TO TOUCH BLOOD SUGAR TWICE DAILY 300 Package 11    Blood-Glucose Meter (ONETOUCH VERIO FLEX) misc Use to check blood sugars twice daily. Dx. Code E11.65 1 Each 0    cholecalciferol, vitamin D3, (VITAMIN D3) 2,000 unit tab Take  by mouth.  KOLE ASPIRIN PO Take  by mouth.        Allergies   Allergen Reactions    Pcn [Penicillins] Hives    Pork Derived (Porcine) Other (comments)     Past Medical History:   Diagnosis Date    Allergies     Diabetes ([address]     Gastroesophageal reflux disease without esophagitis 12/10/2020    GERD (gastroesophageal reflux disease)     Hypercholesterolemia     Hyperlipidemia 12/10/2020    Hypertension     Osteoarthritis 12/10/2020    Shoulder pain 12/10/2020     Past Surgical History:   Procedure Laterality Date    HX COLONOSCOPY      HX OTHER SURGICAL      Gallstones removed     Family History   Problem Relation Age of Onset    Diabetes Mother     Hypertension Mother     Heart Disease Father     Diabetes Maternal Grandmother     Diabetes Sister     Diabetes Brother      Social History     Tobacco Use    Smoking status: Never Smoker    Smokeless tobacco: Never Used   Substance Use Topics    Alcohol use: Yes     Alcohol/week: 3.0 standard drinks     Types: 2 Cans of beer, 1 Shots of liquor per week           Review of Systems   Constitutional: Negative for chills and fever. Musculoskeletal: Positive for myalgias (left upper arm). Negative for joint pain. Skin: Negative for itching and rash. Neurological: Negative for tingling, tremors, sensory change, focal weakness and weakness. Physical Exam  Constitutional:       Appearance: Normal appearance. HENT:      Head: Normocephalic. Eyes:      Conjunctiva/sclera: Conjunctivae normal.   Cardiovascular:      Pulses: Normal pulses. Pulmonary:      Effort: Pulmonary effort is normal.   Musculoskeletal:      Left shoulder: Normal.      Left upper arm: No swelling, edema, tenderness or bony tenderness. Skin:     General: Skin is warm and dry. Neurological:      Mental Status: He is alert and oriented to person, place, and time. Psychiatric:         Mood and Affect: Mood normal.         Behavior: Behavior normal.           ASSESSMENT and PLAN  Diagnoses and all orders for this visit:    1. Strain of left biceps muscle, initial encounter  Comments:  try NSAIDS and ice for 10 days to see if this helps with strain. Orders:  -     naproxen (NAPROSYN) 500 mg tablet; Take 1 Tablet by mouth two (2) times daily (with meals). 2. Uncontrolled type 2 diabetes mellitus with hyperglycemia, without long-term current use of insulin ([facility])  Comments:  following with Dr. Agnieszka Hernandez.  Last a1c was 7.6%.   Orders:  - alcohol swabs (Alcohol Pads) padm; 1 Pad by Apply Externally route two (2) times a day. 3. Obesity, Class III, BMI 40-49.9 (morbid obesity) ([address]  Comments:  working on diet to help with weight loss.   States salads don't digest well         Jareth Fairly, NP

## 2022-04-05 NOTE — PROGRESS NOTES
1. \"Have you been to the ER, urgent care clinic since your last visit? Hospitalized since your last visit? \" Yes When: [id number] Where: [facility] Reason for visit: pneumonia    2. \"Have you seen or consulted any other health care providers outside of the [address] [place] [address] since your last visit? \" Yes When: [id number] Where: [facility] Reason for visit: pneumonia     3. For patients aged 39-70: Has the patient had a colonoscopy / FIT/ Cologuard? Yes - Care Gap present. Most recent result on file      If the patient is female:    4. For patients aged 41-77: Has the patient had a mammogram within the past 2 years? NA - based on age or sex      11. For patients aged 21-65: Has the patient had a pap smear?  NA - based on age or sex     Visit Vitals  BP (!) 141/80 (BP 1 Location: Left arm)   Pulse 88   Temp 97.1 °F (36.2 °C) (Temporal)   Resp 20   Ht 6' 3\" (1.905 m)   Wt 323 lb 6.4 oz (146.7 kg)   SpO2 93%   BMI 40.42 kg/m²       Chief Complaint   Patient presents with    Arm Pain

## 2022-06-06 DIAGNOSIS — K21.9 GASTROESOPHAGEAL REFLUX DISEASE WITHOUT ESOPHAGITIS: ICD-10-CM

## 2022-06-06 DIAGNOSIS — E78.2 MIXED HYPERLIPIDEMIA: Chronic | ICD-10-CM

## 2022-06-06 DIAGNOSIS — I10 ESSENTIAL HYPERTENSION: Chronic | ICD-10-CM

## 2022-06-06 RX ORDER — IRBESARTAN 150 MG/1
TABLET ORAL
Qty: 90 TABLET | Refills: 0 | OUTPATIENT
Start: 2022-06-06

## 2022-06-06 RX ORDER — CARVEDILOL 12.5 MG/1
TABLET ORAL
Qty: 180 TABLET | Refills: 0 | OUTPATIENT
Start: 2022-06-06

## 2022-06-06 RX ORDER — SPIRONOLACTONE 25 MG/1
25 TABLET ORAL DAILY
Qty: 90 TABLET | Refills: 0 | OUTPATIENT
Start: 2022-06-06

## 2022-06-06 RX ORDER — OMEPRAZOLE 20 MG/1
20 CAPSULE, DELAYED RELEASE ORAL DAILY
Qty: 90 CAPSULE | Refills: 1 | OUTPATIENT
Start: 2022-06-06

## 2022-06-06 RX ORDER — SIMVASTATIN 20 MG/1
TABLET, FILM COATED ORAL
Qty: 90 TABLET | Refills: 1 | OUTPATIENT
Start: 2022-06-06

## 2022-08-10 DIAGNOSIS — I10 ESSENTIAL HYPERTENSION: Chronic | ICD-10-CM

## 2022-08-11 RX ORDER — CARVEDILOL 12.5 MG/1
TABLET ORAL
Qty: 180 TABLET | Refills: 0 | Status: SHIPPED | OUTPATIENT
Start: 2022-08-11 | End: 2022-10-31

## 2022-08-17 ENCOUNTER — TELEPHONE (OUTPATIENT)
Dept: PRIMARY CARE CLINIC | Age: 57
End: 2022-08-17

## 2022-08-17 NOTE — TELEPHONE ENCOUNTER
Pt came in office requesting alcohol prep wipes be prescribed. Stated he can not find them over the counter and used them to take blood sugar levels.

## 2022-08-18 NOTE — TELEPHONE ENCOUNTER
Pharmacy states insurance doesn't cover it. Pt states he hasn't been able to find any otc.  Informed pt that I will leave a few up front for  and but also check on Actix

## 2022-08-18 NOTE — TELEPHONE ENCOUNTER
alcohol swabs (Alcohol Pads) padm 200 Pad 3 4/5/2022     Sig - Route: 1 Pad by Apply Externally route two (2) times a day. - Apply Externally    Sent to pharmacy as: alcohol swabs (Alcohol Pads)    E-Prescribing Status: Receipt confirmed by pharmacy (4/5/2022 10:45 AM EDT)        A years worth were sent in April to his pharmacy. Did he check with them to fill?

## 2022-09-05 RX ORDER — SITAGLIPTIN AND METFORMIN HYDROCHLORIDE 50; 1000 MG/1; MG/1
TABLET, FILM COATED, EXTENDED RELEASE ORAL
Qty: 180 TABLET | Refills: 1 | Status: SHIPPED | OUTPATIENT
Start: 2022-09-05

## 2022-10-01 LAB
ALBUMIN SERPL-MCNC: 4.8 G/DL (ref 3.8–4.9)
ALBUMIN/CREAT UR: 30 MG/G CREAT (ref 0–29)
ALBUMIN/GLOB SERPL: 1.7 {RATIO} (ref 1.2–2.2)
ALP SERPL-CCNC: 85 IU/L (ref 44–121)
ALT SERPL-CCNC: 12 IU/L (ref 0–44)
AST SERPL-CCNC: 19 IU/L (ref 0–40)
BILIRUB SERPL-MCNC: 0.7 MG/DL (ref 0–1.2)
BUN SERPL-MCNC: 11 MG/DL (ref 6–24)
BUN/CREAT SERPL: 13 (ref 9–20)
CALCIUM SERPL-MCNC: 9.6 MG/DL (ref 8.7–10.2)
CHLORIDE SERPL-SCNC: 99 MMOL/L (ref 96–106)
CHOLEST SERPL-MCNC: 144 MG/DL (ref 100–199)
CO2 SERPL-SCNC: 17 MMOL/L (ref 20–29)
CREAT SERPL-MCNC: 0.85 MG/DL (ref 0.76–1.27)
CREAT UR-MCNC: 59.3 MG/DL
EGFR: 100 ML/MIN/1.73
EST. AVERAGE GLUCOSE BLD GHB EST-MCNC: 217 MG/DL
GLOBULIN SER CALC-MCNC: 2.8 G/DL (ref 1.5–4.5)
GLUCOSE SERPL-MCNC: 145 MG/DL (ref 70–99)
HBA1C MFR BLD: 9.2 % (ref 4.8–5.6)
HDLC SERPL-MCNC: 35 MG/DL
IMP & REVIEW OF LAB RESULTS: NORMAL
LDLC SERPL CALC-MCNC: 86 MG/DL (ref 0–99)
MICROALBUMIN UR-MCNC: 17.8 UG/ML
POTASSIUM SERPL-SCNC: 4.3 MMOL/L (ref 3.5–5.2)
PROT SERPL-MCNC: 7.6 G/DL (ref 6–8.5)
SODIUM SERPL-SCNC: 142 MMOL/L (ref 134–144)
TRIGL SERPL-MCNC: 126 MG/DL (ref 0–149)
VLDLC SERPL CALC-MCNC: 23 MG/DL (ref 5–40)

## 2022-10-05 ENCOUNTER — OFFICE VISIT (OUTPATIENT)
Dept: ENDOCRINOLOGY | Age: 57
End: 2022-10-05
Payer: COMMERCIAL

## 2022-10-05 VITALS
OXYGEN SATURATION: 93 % | SYSTOLIC BLOOD PRESSURE: 118 MMHG | HEIGHT: 75 IN | WEIGHT: 300 LBS | RESPIRATION RATE: 20 BRPM | TEMPERATURE: 97.5 F | BODY MASS INDEX: 37.3 KG/M2 | DIASTOLIC BLOOD PRESSURE: 68 MMHG | HEART RATE: 71 BPM

## 2022-10-05 DIAGNOSIS — E11.65 UNCONTROLLED TYPE 2 DIABETES MELLITUS WITH HYPERGLYCEMIA, WITHOUT LONG-TERM CURRENT USE OF INSULIN (HCC): Primary | ICD-10-CM

## 2022-10-05 DIAGNOSIS — I10 ESSENTIAL HYPERTENSION: Chronic | ICD-10-CM

## 2022-10-05 DIAGNOSIS — E78.2 MIXED HYPERLIPIDEMIA: ICD-10-CM

## 2022-10-05 DIAGNOSIS — I10 ESSENTIAL HYPERTENSION: ICD-10-CM

## 2022-10-05 DIAGNOSIS — K21.9 GASTROESOPHAGEAL REFLUX DISEASE WITHOUT ESOPHAGITIS: ICD-10-CM

## 2022-10-05 PROCEDURE — 3046F HEMOGLOBIN A1C LEVEL >9.0%: CPT | Performed by: INTERNAL MEDICINE

## 2022-10-05 PROCEDURE — 99214 OFFICE O/P EST MOD 30 MIN: CPT | Performed by: INTERNAL MEDICINE

## 2022-10-05 RX ORDER — OMEPRAZOLE 20 MG/1
20 CAPSULE, DELAYED RELEASE ORAL DAILY
Qty: 90 CAPSULE | Refills: 3 | Status: CANCELLED | OUTPATIENT
Start: 2022-10-05

## 2022-10-05 RX ORDER — SPIRONOLACTONE 25 MG/1
25 TABLET ORAL DAILY
Qty: 90 TABLET | Refills: 3 | Status: SHIPPED | OUTPATIENT
Start: 2022-10-05

## 2022-10-05 RX ORDER — BLOOD-GLUCOSE SENSOR
EACH MISCELLANEOUS
Qty: 2 KIT | Refills: 11 | Status: SHIPPED | OUTPATIENT
Start: 2022-10-05

## 2022-10-05 NOTE — PROGRESS NOTES
HISTORY OF PRESENT ILLNESS  Marcie Cleary is a 59 y.o. male. Patient here for   f/u after last  visit of Type 2 diabetes mellitus  From jan 2022     He says he is doing  \" so -- so \"   His wife had back surgery, several deaths in family   No focusing  on diabetes   He drinks more ETOH       Jan 2022     A long gap of 1.5 years almost   Lost many of relatives to [facility] history :  Referred : by self/pcp  H/o diabetes for many years   Current A1C is 11.6 %  From feb 2018  and symptoms/problems include polyuria, polydipsia and visual disturbances   Current diabetic medications include intensive insulin injection program.   Current monitoring regimen: home blood tests - 2 times daily  Home blood sugar records: trend: fluctuating a lot  Any episodes of hypoglycemia? yes - multiple      Review of Systems   Psychiatric/Behavioral: Negative for depression and memory loss. The patient does not have insomnia. Physical Exam   Constitutional: He is oriented to person, place, and time. He appears well-developed and well-nourished. EDENTULOUS   Psychiatric: He has a normal mood and affect.        Lab Results   Component Value Date/Time    Hemoglobin A1c 9.2 (H) 09/28/2022 10:17 AM    Hemoglobin A1c 7.6 (H) 01/11/2022 11:00 AM    Hemoglobin A1c 10.7 (H) 10/01/2021 02:52 PM    Hemoglobin A1c, External 11.6 02/21/2018 12:00 AM    Glucose 145 (H) 09/28/2022 10:17 AM    Glucose  06/05/2018 04:00 PM    Microalbumin/Creat ratio (mg/g creat) 9 10/01/2021 02:52 PM    Microalb/Creat ratio (ug/mg creat.) 30 (H) 09/28/2022 10:17 AM    Microalbumin,urine random 0.51 10/01/2021 02:52 PM    LDL,Direct 98 07/23/2021 09:38 AM    LDL, calculated 86 09/28/2022 10:17 AM    LDL, calculated 56.6 10/01/2021 02:52 PM    Creatinine 0.85 09/28/2022 10:17 AM      Lab Results   Component Value Date/Time    Cholesterol, total 144 09/28/2022 10:17 AM    HDL Cholesterol 35 (L) 09/28/2022 10:17 AM    LDL,Direct 98 07/23/2021 09:38 AM LDL, calculated 86 09/28/2022 10:17 AM    LDL, calculated 56.6 10/01/2021 02:52 PM    Triglyceride 126 09/28/2022 10:17 AM    CHOL/HDL Ratio 4.1 10/01/2021 02:52 PM     Lab Results   Component Value Date/Time    ALT (SGPT) 12 09/28/2022 10:17 AM    Alk. phosphatase 85 09/28/2022 10:17 AM    Bilirubin, total 0.7 09/28/2022 10:17 AM    Albumin 4.8 09/28/2022 10:17 AM    Protein, total 7.6 09/28/2022 10:17 AM    PLATELET 768 50/80/7500 08:49 AM     Lab Results   Component Value Date/Time    GFR est non-AA >60 10/01/2021 02:52 PM    GFR est AA >60 10/01/2021 02:52 PM    Creatinine 0.85 09/28/2022 10:17 AM    BUN 11 09/28/2022 10:17 AM    Sodium 142 09/28/2022 10:17 AM    Potassium 4.3 09/28/2022 10:17 AM    Chloride 99 09/28/2022 10:17 AM    CO2 17 (L) 09/28/2022 10:17 AM       ASSESSMENT and PLAN    1.  Type 2 DM poorly  controlled : a1c is   9.2 %     from  sept 2022   compared to   7.6%     From     Jan 2022    Compared to   Over 10   %     From   October 2021  ( STEROID USE )   compared to    7.8 % from   Today by  POC   Compared to  9.1 %     From    Jan 2020    compared to   10.9 %      From      By POC,   Oct 2019    Compared to     12.2 %     From  August 2019    Compared to       ??    9.4 %  From august 2018      COMPARED TO    9.7 %   From June 2018    Compared to over 11 %  From feb 2018           Oct 2022   Losing control because of lack of focus - drinking ETOH   Stay on glipizide, janumet xr, trulicity , jardiance , cycloset     He says he will behave and take care of himself         Jan 2022     maintained  Control    Stay on glipizide, janumet xr, trulicity , jardiance , cycloset   He is very sure that he is NOT  taking  Pioglitazone      Jan 2020    He has NOT been taking GLIPIZIDE ( PRESCRIPTION WAS SENT IN [facility] 2019 WITH ALLTHE OTHER MEDS BUT PHARMACY CLAIMS THAT IT DID NTO RECEIVE IT ) oct 2019   INCREASed  glipizide  TO 10 mg twice a day, twenty min before b-fast and dinner     CONTINUE ON cycloset  0.8 mg  3 pills DAILY  within 2 hours of getting up in the morning   janumet xr 50 /1000 mg twice a day with meals    jardiance 25   Mg , right before   b-fast    ( drink plenty of water )  Stay on Trulicity at 1.5 mg a week   Started on  [address] but he is on [employer] license         2. Hypoglycemia :  Educated on treating the hypoglycemia. 3. HTN : continue SPIRONOLACTONE  25 MG A DAY  AND IRBESARTAN 150 MG A DAY     4.  Dyslipidemia : continue SIMVASTATIN 20 MG . 5. use of aspirin to prevent MI and TIA's discussed      6. Obesity : Body mass index is 37.5 kg/m². Discussed TLC and started on inj incretins      7.  HE F/U WITH [facility]  BUT PT HAS NO KIDNEY ISSUES       Reviewed results with patient and discussed the labs being ordered today/bnv  Patient voiced understanding of plan of care

## 2022-10-05 NOTE — PATIENT INSTRUCTIONS
SPECIFIC INSTRUCTIONS BELOW       SPECIFIC INSTRUCTIONS BELOW       Check blood sugars only twice a day by rotation as follows    First day - before b-fast and - before lunch  Second day- before dinner and  before bed time    After 2 days go back to same rotation          Do not skip meals  Do not eat in between meals    Reduce carbs- pasta, rice, potatoes, bread   Do not drink juices or sodas  Donot eat peanut butter     Do not eat sugar free cookies and cakes   Do not eat peaches, grapes, oranges, pineapples and fruit medleys              ---------------------------------------------------------------------------------------------------------------------------------     glipizide TO 10  mg twice a day, twenty min before b-fast and dinner   (Do not take it if you are not eating   Cut the pill to half if eating lesser than normal   Do not avoid lunches )     cycloset  0.8 mg To    5 pills   EVERY DAY  within 2 hours of getting up in the morning     janumet xr 50 /1000 mg twice a day with meals     StaY   jardiance 25   Mg , right before   b-fast    ( drink plenty of water )    Stay on Trulicity at 1.5 mg a week             -------------PAY ATTENTION TO THESE GENERAL INSTRUCTIONS -----------------      - The medications prescribed at this visit will not be available at pharmacy until 6 pm       - YOUR MED LIST IS NOT UP TO DATE AS SOME CHANGES ARE BEING MADE AFTER THE VISIT - FOLLOW SPECIFIC INSTRUCTIONS  ABOVE     -ANY tests other than blood work, which you opt to do  outside the  [facility] imaging facilities, you are responsible for prior authorizations if  required    - [address] [facility]- PLEASE IGNORE     Results     *Normal results will not be notified by a phone call starting January 1 2021   *If you have an upcoming visit, the results will be discussed at the visit   *Please sign up for MY CHART if you want access to your lab and test results  *Abnormal results which require immediate attention will be notified by phone call   *Abnormal results which do not require immediate assistance will be notified in 1-2 weeks       Refills    -    have your pharmacy send us a refill request . Refills are done max for one year and a visit is a must before refills are extended    Follow up appointments -  highly encourage you to make it when you are checking out. We can accommodate you into the schedule based on your clinical situation, but not for extending refills beyond a year. Labs are important to give refills and is important to get labs before the visit     Phone calls  -  Allow  24 hrs.  for non-urgent calls to be returned  Prior authorization - It may take 2-4 weeks to process  Forms  -  FMLA, DMV etc., will take up to 2 weeks to process  Cancellations - please notify the office 2 days in advance   Samples  - will only be dispensed at visits       If not showing for the appointments and cancelling appointments within 24 hours are kept track of and three  of such situations in  two consecutive years will likely be considered for termination from the practice    -------------------------------------------------------------------------------------------------------------------            -------------[place] [address] -----------------      - The medications prescribed at this visit will not be available at pharmacy until 6 pm       - YOUR MED LIST IS NOT UP TO DATE AS SOME CHANGES ARE BEING MADE AFTER THE VISIT - [address] [place] [address]     -ANY tests other than blood work, which you opt to do  outside the  [facility] imaging facilities, you are responsible for prior authorizations if  required    - HEALTH MAINTENANCE IS NOT GOING TO BE UP TO DATE ON YOUR AVS- PLEASE IGNORE     Results     *Normal results will not be notified by a phone call starting January 1 2021   *If you have an upcoming visit, the results will be discussed at the visit *Please sign up for MY CHART if you want access to your lab and test results  *Abnormal results which require immediate attention will be notified by phone call   *Abnormal results which do not require immediate assistance will be notified in 1-2 weeks       Refills    -    have your pharmacy send us a refill request . Refills are done max for one year and a visit is a must before refills are extended    Follow up appointments -  highly encourage you to make it when you are checking out. We can accommodate you into the schedule based on your clinical situation, but not for extending refills beyond a year. Labs are important to give refills and is important to get labs before the visit     Phone calls  -  Allow  24 hrs.  for non-urgent calls to be returned  Prior authorization - It may take 2-4 weeks to process  Forms  -  FMLA, DMV etc., will take up to 2 weeks to process  Cancellations - please notify the office 2 days in advance   Samples  - will only be dispensed at visits       If not showing for the appointments and cancelling appointments within 24 hours are kept track of and three  of such situations in  two consecutive years will likely be considered for termination from the practice    -------------------------------------------------------------------------------------------------------------------

## 2022-10-05 NOTE — PROGRESS NOTES
Brandy Dear is a 59 y.o. male here for   Chief Complaint   Patient presents with    Diabetes       1. Have you been to the ER, urgent care clinic since your last visit? Hospitalized since your last visit? -[address] [place] [address] in Aug for COVID Test    2. Have you seen or consulted any other health care providers outside of the [address] [place] [address] since your last visit?   Include any pap smears or colon screening.-no

## 2022-10-30 DIAGNOSIS — I10 ESSENTIAL HYPERTENSION: Chronic | ICD-10-CM

## 2022-10-31 RX ORDER — CARVEDILOL 12.5 MG/1
TABLET ORAL
Qty: 180 TABLET | Refills: 0 | Status: SHIPPED | OUTPATIENT
Start: 2022-10-31

## 2022-11-01 ENCOUNTER — OFFICE VISIT (OUTPATIENT)
Dept: PRIMARY CARE CLINIC | Age: 57
End: 2022-11-01
Payer: COMMERCIAL

## 2022-11-01 VITALS
DIASTOLIC BLOOD PRESSURE: 64 MMHG | RESPIRATION RATE: 18 BRPM | TEMPERATURE: 98 F | HEART RATE: 67 BPM | HEIGHT: 75 IN | OXYGEN SATURATION: 95 % | BODY MASS INDEX: 38 KG/M2 | SYSTOLIC BLOOD PRESSURE: 128 MMHG | WEIGHT: 305.6 LBS

## 2022-11-01 DIAGNOSIS — K21.9 GASTROESOPHAGEAL REFLUX DISEASE WITHOUT ESOPHAGITIS: ICD-10-CM

## 2022-11-01 DIAGNOSIS — E78.2 MIXED HYPERLIPIDEMIA: Chronic | ICD-10-CM

## 2022-11-01 DIAGNOSIS — Z23 ENCOUNTER FOR IMMUNIZATION: ICD-10-CM

## 2022-11-01 DIAGNOSIS — Z12.11 SCREEN FOR COLON CANCER: Primary | ICD-10-CM

## 2022-11-01 DIAGNOSIS — I10 ESSENTIAL HYPERTENSION: Chronic | ICD-10-CM

## 2022-11-01 PROCEDURE — 3074F SYST BP LT 130 MM HG: CPT | Performed by: NURSE PRACTITIONER

## 2022-11-01 PROCEDURE — 99214 OFFICE O/P EST MOD 30 MIN: CPT | Performed by: NURSE PRACTITIONER

## 2022-11-01 PROCEDURE — 3078F DIAST BP <80 MM HG: CPT | Performed by: NURSE PRACTITIONER

## 2022-11-01 PROCEDURE — 90471 IMMUNIZATION ADMIN: CPT | Performed by: NURSE PRACTITIONER

## 2022-11-01 PROCEDURE — 90686 IIV4 VACC NO PRSV 0.5 ML IM: CPT | Performed by: NURSE PRACTITIONER

## 2022-11-01 RX ORDER — IRBESARTAN 150 MG/1
150 TABLET ORAL
Qty: 90 TABLET | Refills: 3 | Status: SHIPPED | OUTPATIENT
Start: 2022-11-01

## 2022-11-01 RX ORDER — SIMVASTATIN 20 MG/1
20 TABLET, FILM COATED ORAL
Qty: 90 TABLET | Refills: 3 | Status: SHIPPED | OUTPATIENT
Start: 2022-11-01

## 2022-11-01 RX ORDER — OMEPRAZOLE 20 MG/1
20 CAPSULE, DELAYED RELEASE ORAL DAILY
Qty: 90 CAPSULE | Refills: 3 | Status: SHIPPED | OUTPATIENT
Start: 2022-11-01

## 2022-11-01 NOTE — PROGRESS NOTES
HISTORY OF PRESENT ILLNESS  Last Avila is a 59 y.o. male presents for follow up on chronic conditions. GERD:  Patient's acid reflux/GERD is well controlled on current regimen of omeprazole. Hyperlipidemia: Mixed hyperlipidemia well controlled on simvastatin. Denies any leg cramps or malaise from this medication. Reported compliance with taking medication daily. Labs reviewed from endocrinology on 9/28. Hypertension: Patients hypertension is well controlled on regimen of irbesartan. Denies headaches, blurred vision or dizziness. Diabetes: Last A1c was   Lab Results   Component Value Date/Time    Hemoglobin A1c 9.2 (H) 09/28/2022 10:17 AM    Hemoglobin A1c (POC) 7.8 11/19/2020 03:54 PM    Hemoglobin A1c, External 11.6 02/21/2018 12:00 AM   Patient notes that sugars have been elevated secondary to putting his own health on the back burner after having numerous deaths in family and with a few coworkers. Is planning to start getting back on track. Follows with Dr. Last Cramer, endocrinology.   Has follow up scheduled with her in Jan.       Vitals:    11/01/22 0721   BP: 128/64   Pulse: 67   Resp: 18   Temp: 98 °F (36.7 °C)   TempSrc: Oral   SpO2: 95%   Weight: 305 lb 9.6 oz (138.6 kg)   Height: 6' 3\" (1.905 m)     Patient Active Problem List   Diagnosis Code    Obesity, morbid (HCC) E66.01    Uncontrolled type 2 diabetes mellitus with hyperglycemia, without long-term current use of insulin ([facility]) E11.65    Mixed hyperlipidemia E78.2    Essential hypertension I10    Gastroesophageal reflux disease without esophagitis K21.9    Hyperlipidemia E78.5    Osteoarthritis M19.90    Shoulder pain M25.519     Patient Active Problem List    Diagnosis Date Noted    Gastroesophageal reflux disease without esophagitis 12/10/2020    Hyperlipidemia 12/10/2020    Osteoarthritis 12/10/2020    Shoulder pain 12/10/2020    Uncontrolled type 2 diabetes mellitus with hyperglycemia, without long-term current use of insulin ([facility] 06/10/2018    Mixed hyperlipidemia 06/10/2018    Essential hypertension 06/10/2018    Obesity, morbid ([facility] 06/05/2018     Current Outpatient Medications   Medication Sig Dispense Refill    simvastatin (ZOCOR) 20 mg tablet Take 1 Tablet by mouth nightly. 90 Tablet 3    omeprazole (PRILOSEC) 20 mg capsule Take 1 Capsule by mouth daily. 90 Capsule 3    irbesartan (AVAPRO) 150 mg tablet Take 1 Tablet by mouth nightly. 90 Tablet 3    carvediloL (COREG) 12.5 mg tablet TAKE 1 TABLET BY MOUTH TWICE DAILY 180 Tablet 0    spironolactone (ALDACTONE) 25 mg tablet Take 1 Tablet by mouth daily. 90 Tablet 3    flash glucose sensor (FreeStyle Janessa 3 Sensor) kit Use as directed. Dx code: E11.65 2 Kit 11    Janumet XR 50-1,000 mg TM24 TAKE 1 TABLET BY MOUTH TWICE DAILY. STOP 100/1000 180 Tablet 1    alcohol swabs (Alcohol Pads) padm 1 Pad by Apply Externally route two (2) times a day. 200 Pad 3    dulaglutide (Trulicity) 1.5 LS/3.6 mL sub-q pen 1.5 mg a week 6 mL 2    empagliflozin (Jardiance) 25 mg tablet TAKE 1 TABLET BY MOUTH DAILY BEFORE BREAKFAST 90 Tablet 4    bromocriptine (Cycloset) 0.8 mg tablet GRADUALLY INCREASE TO 5 TABLETS BY MOUTH WITHIN 2 HOURS OF WAKING UP IN THE MORNING 450 Tablet 4    glipiZIDE (GLUCOTROL) 10 mg tablet TAKE 1 TABLET BY MOUTH 20 MINUTES BEFORE BREAKFAST AND 1 TABLET 20 MINUTES BEFORE DINNER 180 Tablet 4    albuterol (PROVENTIL HFA, VENTOLIN HFA, PROAIR HFA) 90 mcg/actuation inhaler       glucose blood VI test strips (ONETOUCH VERIO) strip USE TO CHECK BLOOD SUGAR TWICE DAILY 200 Strip 4    lancets (LANCETS,ULTRA THIN) 26 gauge misc 1 Package by SubCUTAneous route two (2) times a day. USE TO TOUCH BLOOD SUGAR TWICE DAILY 300 Package 11    Blood-Glucose Meter (ONETOUCH VERIO FLEX) misc Use to check blood sugars twice daily. Dx. Code E11.65 1 Each 0    cholecalciferol, vitamin D3, 50 mcg (2,000 unit) tab Take  by mouth. KOLE ASPIRIN PO Take  by mouth.       ascorbic acid (VITAMIN C PO) Vitamin C       Allergies   Allergen Reactions    Pcn [Penicillins] Hives    Pork Derived (Porcine) Other (comments)     Past Medical History:   Diagnosis Date    Allergies     Diabetes ([address]     Gastroesophageal reflux disease without esophagitis 12/10/2020    GERD (gastroesophageal reflux disease)     Hypercholesterolemia     Hyperlipidemia 12/10/2020    Hypertension     Osteoarthritis 12/10/2020    Shoulder pain 12/10/2020     Past Surgical History:   Procedure Laterality Date    HX COLONOSCOPY      HX OTHER SURGICAL      Gallstones removed     Family History   Problem Relation Age of Onset    Diabetes Mother     Hypertension Mother     Heart Disease Father     Diabetes Maternal Grandmother     Diabetes Sister     Diabetes Brother      Social History     Tobacco Use    Smoking status: Never    Smokeless tobacco: Never   Substance Use Topics    Alcohol use: Yes     Alcohol/week: 3.0 standard drinks     Types: 2 Cans of beer, 1 Shots of liquor per week           Review of Systems   Constitutional:  Negative for malaise/fatigue and weight loss. Eyes:  Negative for blurred vision and double vision. Respiratory:  Negative for shortness of breath. Cardiovascular:  Negative for chest pain and palpitations. Neurological:  Negative for dizziness, tingling, tremors and headaches. Psychiatric/Behavioral:  The patient is not nervous/anxious and does not have insomnia. Physical Exam  Constitutional:       Appearance: Normal appearance. He is obese. HENT:      Head: Normocephalic and atraumatic. Eyes:      Extraocular Movements: Extraocular movements intact. Conjunctiva/sclera: Conjunctivae normal.      Pupils: Pupils are equal, round, and reactive to light. Cardiovascular:      Rate and Rhythm: Normal rate and regular rhythm. Pulses: Normal pulses. Pulmonary:      Effort: Pulmonary effort is normal.      Breath sounds: Normal breath sounds.    Musculoskeletal:         General: Normal range of motion. Skin:     General: Skin is warm and dry. Neurological:      General: No focal deficit present. Mental Status: He is alert and oriented to person, place, and time. Psychiatric:         Mood and Affect: Mood normal.         Behavior: Behavior normal.         ASSESSMENT and PLAN  Diagnoses and all orders for this visit:    1. Screen for colon cancer  -     REFERRAL TO GASTROENTEROLOGY    2. Encounter for immunization  -     INFLUENZA, FLUARIX, FLULAVAL, FLUZONE (AGE 6 MO+), AFLURIA(AGE 3Y+) IM, PF, 0.5 ML    3. Mixed hyperlipidemia  Comments:  stable on current medication. Orders:  -     simvastatin (ZOCOR) 20 mg tablet; Take 1 Tablet by mouth nightly. 4. Gastroesophageal reflux disease without esophagitis  Comments:  well controlled on omeprazole  Orders:  -     omeprazole (PRILOSEC) 20 mg capsule; Take 1 Capsule by mouth daily. 5. Essential hypertension  Comments:  well controlled on current regimen  Orders:  -     irbesartan (AVAPRO) 150 mg tablet; Take 1 Tablet by mouth nightly.        Helio Gee NP

## 2022-11-01 NOTE — PROGRESS NOTES
Chief Complaint   Patient presents with    Medication Refill       Visit Vitals  /64 (BP 1 Location: Left upper arm, BP Patient Position: Sitting, BP Cuff Size: Adult)   Pulse 67   Temp 98 °F (36.7 °C) (Oral)   Resp 18   Ht 6' 3\" (1.905 m)   Wt 305 lb 9.6 oz (138.6 kg)   SpO2 95%   BMI 38.20 kg/m²       1. Have you been to the ER, urgent care clinic since your last visit? Hospitalized since your last visit? No    2. Have you seen or consulted any other health care providers outside of the [address] [place] [address] since your last visit? Include any pap smears or colon screening.  No

## 2022-11-25 RX ORDER — SITAGLIPTIN AND METFORMIN HYDROCHLORIDE 50; 1000 MG/1; MG/1
TABLET, FILM COATED, EXTENDED RELEASE ORAL
Qty: 180 TABLET | Refills: 1 | Status: SHIPPED | OUTPATIENT
Start: 2022-11-25

## 2023-01-03 ENCOUNTER — TELEPHONE (OUTPATIENT)
Dept: ENDOCRINOLOGY | Age: 58
End: 2023-01-03

## 2023-01-03 NOTE — TELEPHONE ENCOUNTER
Review of the download shows that he has good control of the sugars most of the time      Expect blood glucose to go up high after eating ,it depends on the starch content, if he reduces the starch content the spiking will decrease      2 hours after the meal blood glucose is improving which is a good sign    Make sure he is taking glipizide 20 minutes before the meal

## 2023-01-03 NOTE — TELEPHONE ENCOUNTER
Patient would like to speak to someone in ref to blood sugar levels increasing after taking medication.  Thank you

## 2023-01-03 NOTE — TELEPHONE ENCOUNTER
Returned call to patient. He states that when he takes his diabetic medication his blood glucose level goes up to 210-230 [facility] He states prior to taking his medicine his blood glucose levels are in the 110's-120's. He states that it starts to trend back down about an hour later. He states that his blood glucose level doesn't start to go up until after he takes the medication. He states that there isn't any correlation to the blood glucose level spiking. Helped patient connect to [address] while on the phone and printed out his readings for review. Advised patient that since his levels only spike around the time for his medication then come back down that the doctor may to want to change anything but will check to see and return call with any recommendations.

## 2023-03-03 DIAGNOSIS — I10 ESSENTIAL HYPERTENSION: Chronic | ICD-10-CM

## 2023-03-03 DIAGNOSIS — E11.65 UNCONTROLLED TYPE 2 DIABETES MELLITUS WITH HYPERGLYCEMIA, WITHOUT LONG-TERM CURRENT USE OF INSULIN (HCC): ICD-10-CM

## 2023-03-03 RX ORDER — BROMOCRIPTINE MESYLATE 0.8 MG/1
TABLET ORAL
Qty: 450 TABLET | Refills: 4 | Status: SHIPPED | OUTPATIENT
Start: 2023-03-03

## 2023-03-03 RX ORDER — GLIPIZIDE 10 MG/1
TABLET ORAL
Qty: 180 TABLET | Refills: 4 | Status: SHIPPED | OUTPATIENT
Start: 2023-03-03

## 2023-03-03 RX ORDER — CARVEDILOL 12.5 MG/1
TABLET ORAL
Qty: 180 TABLET | Refills: 0 | Status: SHIPPED | OUTPATIENT
Start: 2023-03-03

## 2023-08-30 RX ORDER — OMEPRAZOLE 20 MG/1
CAPSULE, DELAYED RELEASE ORAL
Qty: 90 CAPSULE | Refills: 0 | Status: SHIPPED | OUTPATIENT
Start: 2023-08-30 | End: 2023-09-12 | Stop reason: SDUPTHER

## 2023-08-30 RX ORDER — SPIRONOLACTONE 25 MG/1
TABLET ORAL
Qty: 90 TABLET | OUTPATIENT
Start: 2023-08-30

## 2023-08-30 RX ORDER — IRBESARTAN 150 MG/1
TABLET ORAL
Qty: 90 TABLET | Refills: 0 | Status: SHIPPED | OUTPATIENT
Start: 2023-08-30 | End: 2023-09-12 | Stop reason: SDUPTHER

## 2023-08-30 NOTE — TELEPHONE ENCOUNTER
Refills sent in on requested medication. Patient is due for appointment before refills run out, please call to get them scheduled.

## 2023-09-12 ENCOUNTER — OFFICE VISIT (OUTPATIENT)
Dept: PRIMARY CARE CLINIC | Facility: CLINIC | Age: 58
End: 2023-09-12
Payer: COMMERCIAL

## 2023-09-12 VITALS
TEMPERATURE: 97.5 F | DIASTOLIC BLOOD PRESSURE: 71 MMHG | HEIGHT: 75 IN | SYSTOLIC BLOOD PRESSURE: 127 MMHG | BODY MASS INDEX: 37.67 KG/M2 | WEIGHT: 303 LBS | HEART RATE: 74 BPM

## 2023-09-12 DIAGNOSIS — T78.40XA ALLERGY, INITIAL ENCOUNTER: ICD-10-CM

## 2023-09-12 DIAGNOSIS — E78.2 MIXED HYPERLIPIDEMIA: ICD-10-CM

## 2023-09-12 DIAGNOSIS — K21.9 GASTRO-ESOPHAGEAL REFLUX DISEASE WITHOUT ESOPHAGITIS: Chronic | ICD-10-CM

## 2023-09-12 DIAGNOSIS — I10 ESSENTIAL (PRIMARY) HYPERTENSION: ICD-10-CM

## 2023-09-12 DIAGNOSIS — E11.65 TYPE 2 DIABETES MELLITUS WITH HYPERGLYCEMIA, WITHOUT LONG-TERM CURRENT USE OF INSULIN (HCC): Primary | ICD-10-CM

## 2023-09-12 PROCEDURE — 3074F SYST BP LT 130 MM HG: CPT | Performed by: NURSE PRACTITIONER

## 2023-09-12 PROCEDURE — 3078F DIAST BP <80 MM HG: CPT | Performed by: NURSE PRACTITIONER

## 2023-09-12 PROCEDURE — 99214 OFFICE O/P EST MOD 30 MIN: CPT | Performed by: NURSE PRACTITIONER

## 2023-09-12 PROCEDURE — 3052F HG A1C>EQUAL 8.0%<EQUAL 9.0%: CPT | Performed by: NURSE PRACTITIONER

## 2023-09-12 RX ORDER — IRBESARTAN 150 MG/1
150 TABLET ORAL NIGHTLY
Qty: 90 TABLET | Refills: 0 | Status: SHIPPED | OUTPATIENT
Start: 2023-09-12

## 2023-09-12 RX ORDER — LORATADINE 10 MG/1
10 TABLET ORAL DAILY
Qty: 90 TABLET | Refills: 3 | Status: SHIPPED | OUTPATIENT
Start: 2023-09-12

## 2023-09-12 RX ORDER — OMEPRAZOLE 20 MG/1
20 CAPSULE, DELAYED RELEASE ORAL DAILY
Qty: 90 CAPSULE | Refills: 0 | Status: SHIPPED | OUTPATIENT
Start: 2023-09-12

## 2023-09-12 RX ORDER — SIMVASTATIN 20 MG
20 TABLET ORAL NIGHTLY
Qty: 90 TABLET | Refills: 1 | Status: SHIPPED | OUTPATIENT
Start: 2023-09-12

## 2023-09-12 RX ORDER — CARVEDILOL 12.5 MG/1
12.5 TABLET ORAL 2 TIMES DAILY
Qty: 180 TABLET | Refills: 1 | Status: SHIPPED | OUTPATIENT
Start: 2023-09-12

## 2023-09-12 SDOH — ECONOMIC STABILITY: FOOD INSECURITY: WITHIN THE PAST 12 MONTHS, THE FOOD YOU BOUGHT JUST DIDN'T LAST AND YOU DIDN'T HAVE MONEY TO GET MORE.: NEVER TRUE

## 2023-09-12 SDOH — ECONOMIC STABILITY: INCOME INSECURITY: HOW HARD IS IT FOR YOU TO PAY FOR THE VERY BASICS LIKE FOOD, HOUSING, MEDICAL CARE, AND HEATING?: NOT HARD AT ALL

## 2023-09-12 SDOH — ECONOMIC STABILITY: HOUSING INSECURITY
IN THE LAST 12 MONTHS, WAS THERE A TIME WHEN YOU DID NOT HAVE A STEADY PLACE TO SLEEP OR SLEPT IN A SHELTER (INCLUDING NOW)?: NO

## 2023-09-12 SDOH — ECONOMIC STABILITY: FOOD INSECURITY: WITHIN THE PAST 12 MONTHS, YOU WORRIED THAT YOUR FOOD WOULD RUN OUT BEFORE YOU GOT MONEY TO BUY MORE.: NEVER TRUE

## 2023-09-12 ASSESSMENT — PATIENT HEALTH QUESTIONNAIRE - PHQ9
SUM OF ALL RESPONSES TO PHQ9 QUESTIONS 1 & 2: 0
SUM OF ALL RESPONSES TO PHQ QUESTIONS 1-9: 0
2. FEELING DOWN, DEPRESSED OR HOPELESS: 0
SUM OF ALL RESPONSES TO PHQ QUESTIONS 1-9: 0
1. LITTLE INTEREST OR PLEASURE IN DOING THINGS: 0

## 2023-09-12 ASSESSMENT — ENCOUNTER SYMPTOMS
COUGH: 1
SHORTNESS OF BREATH: 0

## 2023-09-12 NOTE — PROGRESS NOTES
Identified Patient with two Patient identifiers(name and ). 1. Have you been to the ER, urgent care clinic since your last visit? Hospitalized since your last visit? No    2. Have you seen or consulted any other health care providers outside of the [address] [place] [address] since your last visit? No     3. For patients aged 43-73: Has the patient had a colonoscopy / FIT/ Cologuard? Yes-No Care Gap Present    If the patient is female:    4. For patients aged 43-66: Has the patient had a mammogram within the past 2 years? No      5. For patients aged 21-65: Has the patient had a pap smear? No   There were no vitals taken for this visit. Chief Complaint   Patient presents with    Cough     Cough came back and is having some mucus and think its the season change that triggers it also a little hard to breathe       Health Maintenance Due   Topic Date Due    Pneumococcal 0-64 years Vaccine (1 - PCV) Never done    Diabetic foot exam  Never done    HIV screen  Never done    Shingles vaccine (1 of 2) Never done    Diabetic retinal exam  2020    Colorectal Cancer Screen  2022    COVID-19 Vaccine (4 - Pfizer series) 04/15/2022    Flu vaccine (1) 2023    Hepatitis B vaccine (1 of 3 - Risk 3-dose series) Never done          No questionnaires available.
Pulses: Normal pulses. Heart sounds: Normal heart sounds. Pulmonary:      Effort: Pulmonary effort is normal.      Breath sounds: Normal breath sounds. Musculoskeletal:      Cervical back: Normal range of motion. Skin:     General: Skin is warm and dry. Neurological:      Mental Status: He is alert and oriented to person, place, and time. Psychiatric:         Mood and Affect: Mood normal.         Behavior: Behavior normal.          Allergies   Allergen Reactions    Penicillins Hives       Current Outpatient Medications   Medication Sig Dispense Refill    simvastatin (ZOCOR) 20 MG tablet Take 1 tablet by mouth nightly 90 tablet 1    irbesartan (AVAPRO) 150 MG tablet Take 1 tablet by mouth nightly 90 tablet 0    omeprazole (PRILOSEC) 20 MG delayed release capsule Take 1 capsule by mouth daily 90 capsule 0    loratadine (CLARITIN) 10 MG tablet Take 1 tablet by mouth daily 90 tablet 3    carvedilol (COREG) 12.5 MG tablet Take 1 tablet by mouth 2 times daily 180 tablet 1    KENNETH ASPIRIN PO Take by mouth      albuterol sulfate HFA (PROVENTIL;VENTOLIN;PROAIR) 108 (90 Base) MCG/ACT inhaler ceived the following from [facility]: Outside name: albuterol (PROVENTIL HFA, VENTOLIN HFA, PROAIR HFA) 90 mcg/actuation inhaler      Bromocriptine Mesylate (CYCLOSET) 0.8 MG TABS GRADUALLY INCREASE TO 5 TABLETS BY MOUTH WITHIN 2 HOURS OF WAKING UP IN THE MORNING      Cholecalciferol 50 MCG (2000 UT) TABS Take by mouth      empagliflozin (JARDIANCE) 25 MG tablet TAKE 1 TABLET BY MOUTH DAILY BEFORE BREAKFAST      glipiZIDE (GLUCOTROL) 10 MG tablet TAKE 1 TABLET BY MOUTH 20 MINUTES BEFORE BREAKFAST AND 20 MINUTES BEFORE DINNER      SITagliptin-metFORMIN (JANUMET XR)  MG TB24 per extended release tablet TAKE 1 TABLET BY MOUTH TWICE DAILY.  STOP 100/1000      spironolactone (ALDACTONE) 25 MG tablet Take by mouth daily      dulaglutide (TRULICITY) 1.5 XE/0.4US SC injection 1.5 mg a week (Patient not

## 2023-10-20 ENCOUNTER — TELEPHONE (OUTPATIENT)
Dept: PRIMARY CARE CLINIC | Facility: CLINIC | Age: 58
End: 2023-10-20

## 2023-10-20 DIAGNOSIS — T78.40XA ALLERGY, INITIAL ENCOUNTER: Primary | ICD-10-CM

## 2023-10-20 NOTE — TELEPHONE ENCOUNTER
Spoke with pt concerning cough, states that he is taking allergy medication and is still having the cough that is taking his breath, a lot of mucus drainage, no fever, no sore throat.

## 2023-10-24 RX ORDER — AZELASTINE 1 MG/ML
1 SPRAY, METERED NASAL 2 TIMES DAILY
Qty: 60 ML | Refills: 1 | Status: SHIPPED | OUTPATIENT
Start: 2023-10-24

## 2023-10-24 NOTE — TELEPHONE ENCOUNTER
I have sent in a nasal spray for him to try in addition to the allergy pill to see if we can stop the drainage that he is having which I think it causing the cough. If not seeing an improvement in the next 1-2 weeks, please advise him to schedule a follow up with me to evaluate.

## 2023-10-24 NOTE — TELEPHONE ENCOUNTER
Spoke with pt informed pt of rx and advised to take in addition with allergy pill.  Also advised if no improvement then to schedule a f/u appt with pcp

## 2024-01-17 ENCOUNTER — NURSE ONLY (OUTPATIENT)
Age: 59
End: 2024-01-17

## 2024-01-17 DIAGNOSIS — E11.65 TYPE 2 DIABETES MELLITUS WITH HYPERGLYCEMIA, UNSPECIFIED WHETHER LONG TERM INSULIN USE (HCC): Primary | ICD-10-CM

## 2024-01-17 DIAGNOSIS — E11.65 TYPE 2 DIABETES MELLITUS WITH HYPERGLYCEMIA, UNSPECIFIED WHETHER LONG TERM INSULIN USE (HCC): ICD-10-CM

## 2024-01-17 DIAGNOSIS — E78.2 MIXED HYPERLIPIDEMIA: ICD-10-CM

## 2024-01-18 ENCOUNTER — TELEPHONE (OUTPATIENT)
Dept: PRIMARY CARE CLINIC | Facility: CLINIC | Age: 59
End: 2024-01-18

## 2024-01-18 ENCOUNTER — TELEPHONE (OUTPATIENT)
Age: 59
End: 2024-01-18

## 2024-01-18 LAB
ALBUMIN SERPL-MCNC: 3.9 G/DL (ref 3.5–5)
ALBUMIN/GLOB SERPL: 1 (ref 1.1–2.2)
ALP SERPL-CCNC: 73 U/L (ref 45–117)
ALT SERPL-CCNC: 18 U/L (ref 12–78)
ANION GAP SERPL CALC-SCNC: 8 MMOL/L (ref 5–15)
AST SERPL-CCNC: 16 U/L (ref 15–37)
BILIRUB SERPL-MCNC: 1.3 MG/DL (ref 0.2–1)
BUN SERPL-MCNC: 15 MG/DL (ref 6–20)
BUN/CREAT SERPL: 16 (ref 12–20)
CALCIUM SERPL-MCNC: 9.3 MG/DL (ref 8.5–10.1)
CHLORIDE SERPL-SCNC: 103 MMOL/L (ref 97–108)
CHOLEST SERPL-MCNC: 136 MG/DL
CO2 SERPL-SCNC: 27 MMOL/L (ref 21–32)
CREAT SERPL-MCNC: 0.96 MG/DL (ref 0.7–1.3)
CREAT UR-MCNC: 57.1 MG/DL
EST. AVERAGE GLUCOSE BLD GHB EST-MCNC: 237 MG/DL
GLOBULIN SER CALC-MCNC: 4 G/DL (ref 2–4)
GLUCOSE SERPL-MCNC: 171 MG/DL (ref 65–100)
HBA1C MFR BLD: 9.9 % (ref 4–5.6)
HDLC SERPL-MCNC: 35 MG/DL
HDLC SERPL: 3.9 (ref 0–5)
LDLC SERPL CALC-MCNC: 74.8 MG/DL (ref 0–100)
MICROALBUMIN UR-MCNC: 2.96 MG/DL
MICROALBUMIN/CREAT UR-RTO: 52 MG/G (ref 0–30)
POTASSIUM SERPL-SCNC: 3.9 MMOL/L (ref 3.5–5.1)
PROT SERPL-MCNC: 7.9 G/DL (ref 6.4–8.2)
SODIUM SERPL-SCNC: 138 MMOL/L (ref 136–145)
TRIGL SERPL-MCNC: 131 MG/DL
VLDLC SERPL CALC-MCNC: 26.2 MG/DL

## 2024-01-18 NOTE — TELEPHONE ENCOUNTER
----- Message from Latosha Gauravritchie sent at 1/11/2024 10:31 AM EST -----  Subject: Appointment Request    Reason for Call: Established Patient Appointment needed: Routine Existing   Condition Follow Up    QUESTIONS    Reason for appointment request? No appointments available during search     Additional Information for Provider? Deon Foote would you like to   schedule an appointment to have labs and pneumonia vaccine. 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   Please call to set up appt.  ---------------------------------------------------------------------------  --------------  CALL BACK INFO  [phone]; OK to leave message on voicemail  ---------------------------------------------------------------------------  --------------  SCRIPT ANSWERS

## 2024-01-18 NOTE — TELEPHONE ENCOUNTER
Pt wants to know results of his bloodwork - he wants someone to call him. I did reactivate his my chart account but he still wants a telephone call.

## 2024-01-18 NOTE — TELEPHONE ENCOUNTER
First of all, pt has not seen me since October 2022     He is asking for Testosterone labs which we did nto do     He has coming up visit in feb and I will discuss those results then     Marivel Lomeli MD

## 2024-01-25 ENCOUNTER — TELEPHONE (OUTPATIENT)
Dept: PRIMARY CARE CLINIC | Facility: CLINIC | Age: 59
End: 2024-01-25

## 2024-02-01 ENCOUNTER — OFFICE VISIT (OUTPATIENT)
Age: 59
End: 2024-02-01
Payer: COMMERCIAL

## 2024-02-01 VITALS
WEIGHT: 309 LBS | OXYGEN SATURATION: 96 % | SYSTOLIC BLOOD PRESSURE: 128 MMHG | BODY MASS INDEX: 35.75 KG/M2 | DIASTOLIC BLOOD PRESSURE: 82 MMHG | TEMPERATURE: 96.9 F | HEART RATE: 60 BPM | HEIGHT: 78 IN

## 2024-02-01 DIAGNOSIS — I10 ESSENTIAL (PRIMARY) HYPERTENSION: ICD-10-CM

## 2024-02-01 DIAGNOSIS — E78.2 MIXED HYPERLIPIDEMIA: ICD-10-CM

## 2024-02-01 DIAGNOSIS — E11.65 TYPE 2 DIABETES MELLITUS WITH HYPERGLYCEMIA, UNSPECIFIED WHETHER LONG TERM INSULIN USE (HCC): Primary | ICD-10-CM

## 2024-02-01 PROCEDURE — 3074F SYST BP LT 130 MM HG: CPT | Performed by: INTERNAL MEDICINE

## 2024-02-01 PROCEDURE — 3079F DIAST BP 80-89 MM HG: CPT | Performed by: INTERNAL MEDICINE

## 2024-02-01 PROCEDURE — 99215 OFFICE O/P EST HI 40 MIN: CPT | Performed by: INTERNAL MEDICINE

## 2024-02-01 PROCEDURE — 3046F HEMOGLOBIN A1C LEVEL >9.0%: CPT | Performed by: INTERNAL MEDICINE

## 2024-02-01 RX ORDER — BLOOD-GLUCOSE SENSOR
EACH MISCELLANEOUS
Qty: 6 EACH | Refills: 3 | Status: SHIPPED | OUTPATIENT
Start: 2024-02-01

## 2024-02-01 RX ORDER — SEMAGLUTIDE 1.34 MG/ML
INJECTION, SOLUTION SUBCUTANEOUS
Qty: 9 ML | Refills: 2 | Status: SHIPPED | OUTPATIENT
Start: 2024-02-01

## 2024-02-01 RX ORDER — GLIPIZIDE 10 MG/1
TABLET ORAL
Qty: 180 TABLET | Refills: 3 | Status: SHIPPED | OUTPATIENT
Start: 2024-02-01

## 2024-02-01 RX ORDER — BROMOCRIPTINE MESYLATE 0.8 MG/1
TABLET ORAL
Qty: 150 TABLET | Refills: 3 | Status: SHIPPED | OUTPATIENT
Start: 2024-02-01

## 2024-02-01 RX ORDER — SITAGLIPTIN AND METFORMIN HYDROCHLORIDE 1000; 50 MG/1; MG/1
TABLET, FILM COATED, EXTENDED RELEASE ORAL
Qty: 180 TABLET | Refills: 3 | Status: SHIPPED | OUTPATIENT
Start: 2024-02-01

## 2024-02-01 NOTE — PROGRESS NOTES
Deon Foote is a 58 y.o. male here for   Chief Complaint   Patient presents with    Diabetes       1. Have you been to the ER, urgent care clinic since your last visit?  Hospitalized since your last visit? - [place] [facility] in Oct for cough    2. Have you seen or consulted any other health care providers outside of the [facility] since your last visit?  Include any pap smears or colon screening.- PCP

## 2024-02-01 NOTE — PATIENT INSTRUCTIONS
SPECIFIC INSTRUCTIONS BELOW        glipizide TO 10  mg twice a day, twenty min before b-fast and dinner   (Do not take it if you are not eating   Cut the pill to half if eating lesser than normal   Do not avoid lunches )      cycloset  0.8 mg To    5 pills   EVERY DAY  within 2 hours of getting up in the morning      janumet xr 50 /1000 mg twice a day with meals      StaY   jardiance 25   Mg , right before   b-fast    ( drink plenty of water )     Stop  Trulicity  Start on  ozempic   at 1  mg a week                -------------PAY ATTENTION TO THESE GENERAL INSTRUCTIONS -----------------      - The medications prescribed at this visit will not be available at pharmacy until 6 pm today        - your med list is not updated until the visit encounter is closed, so FOLLOW THE TYPED SPECIFIC INSTRUCTIONS  ABOVE     -ANY tests other than blood work, which you opt to do  outside the  [facility] imaging facilities, you are responsible for prior authorizations if  required    - health maintenance will not be updated  on AVS, so please ignore it       Results     *Normal results will not be notified by a phone call starting January 1 2024   *If you have an upcoming visit, the results will be discussed at the visit   *Please sign up for MY CHART if you want access to your lab and test results  *Abnormal results which require immediate attention will be notified by phone call   *Abnormal results which do not require immediate assistance will be notified in 1-2 weeks       Refills    -    have your pharmacy send us a refill request . Refills are done max for one year and a visit is a must before refills are extended    Follow up appointments -  highly encourage you to make it when you are checking out. We can accommodate you into the schedule based on your clinical situation, but not for extending refills beyond a year. Labs are important to give refills and it is important to get labs before the visit     Phone calls  -

## 2024-02-01 NOTE — PROGRESS NOTES
[facility] DIABETES AND ENDOCRINOLOGY              Marivel Lomeli MD FACE           HISTORY OF PRESENT ILLNESS   Deon Foote is a 58 y.o.  male.   Patient here for   f/u after last  visit of Type 2 diabetes mellitus   From october 2022       A gap of 14 months   He has no clear reason not to follow up   He reports that he is taking  all the meds but for trulicity   He likes to use markie 3       October 2022      He says he is doing  \" so -- so \"    His wife had back surgery, several deaths in family    No focusing  on diabetes    He drinks more ETOH          Jan 2022       A long gap of 1.5 years almost    Lost many of relatives to COVID          Old history :   Referred : by self/pcp   H/o diabetes for many years    Current A1C is 11.6 %  From feb 2018  and symptoms/problems include polyuria, polydipsia and visual disturbances    Current diabetic medications include intensive insulin injection program.    Current monitoring regimen: home blood tests - 2 times daily   Home blood sugar records: trend: fluctuating a lot   Any episodes of hypoglycemia? yes - multiple         Review of Systems    Psychiatric/Behavioral: Negative for depression and memory loss. The patient does not have insomnia.           Physical Exam    Constitutional: He is oriented to person, place, and time. He appears well-developed and well-nourished.    EDENTULOUS    Psychiatric: He has a normal mood and affect.         Labs      Lab Results   Component Value Date    LABA1C 9.9 (H) 01/17/2024    LABA1C 8.1 (H) 03/31/2023    LABA1C 8.5 (H) 01/17/2023       Lab Results   Component Value Date     01/17/2024    K 3.9 01/17/2024     01/17/2024    CO2 27 01/17/2024    BUN 15 01/17/2024    CREATININE 0.96 01/17/2024    GLUCOSE 171 (H) 01/17/2024    CALCIUM 9.3 01/17/2024    PROT 7.9 01/17/2024    LABALBU 3.9 01/17/2024    BILITOT 1.3 (H) 01/17/2024    ALKPHOS 73 01/17/2024    AST 16 01/17/2024    ALT 18 01/17/2024    LABGLOM >60

## 2024-02-05 ENCOUNTER — TELEPHONE (OUTPATIENT)
Age: 59
End: 2024-02-05

## 2024-02-05 DIAGNOSIS — E11.65 TYPE 2 DIABETES MELLITUS WITH HYPERGLYCEMIA, UNSPECIFIED WHETHER LONG TERM INSULIN USE (HCC): Primary | ICD-10-CM

## 2024-02-05 RX ORDER — METFORMIN HYDROCHLORIDE 750 MG/1
TABLET, EXTENDED RELEASE ORAL
Qty: 180 TABLET | Refills: 3 | Status: SHIPPED | OUTPATIENT
Start: 2024-02-05

## 2024-02-06 NOTE — TELEPHONE ENCOUNTER
Notify patient that  ozempic  can be approved only when he stops janumet xr     So , I changed the med to metformin ER - let him try that     Then after a month or so -- we can ask  for  ozempic again but he has to send a reminder     Marivel Lomeli MD

## 2024-02-06 NOTE — TELEPHONE ENCOUNTER
Patient returned call. Advised him to stop Janumet and start Metformin.  Dr. Lomeli sent in new prescription.  Advised Jason Dary to call office after 1 month and update so Dr. Lomeli can call in [facility].  Patient understood.

## 2024-02-26 DIAGNOSIS — E78.2 MIXED HYPERLIPIDEMIA: ICD-10-CM

## 2024-02-26 DIAGNOSIS — I10 ESSENTIAL (PRIMARY) HYPERTENSION: ICD-10-CM

## 2024-02-26 RX ORDER — CARVEDILOL 12.5 MG/1
12.5 TABLET ORAL 2 TIMES DAILY
Qty: 180 TABLET | Refills: 1 | Status: SHIPPED | OUTPATIENT
Start: 2024-02-26

## 2024-02-26 RX ORDER — SIMVASTATIN 20 MG
20 TABLET ORAL NIGHTLY
Qty: 90 TABLET | Refills: 1 | Status: SHIPPED | OUTPATIENT
Start: 2024-02-26

## 2024-03-02 DIAGNOSIS — E11.65 TYPE 2 DIABETES MELLITUS WITH HYPERGLYCEMIA, UNSPECIFIED WHETHER LONG TERM INSULIN USE (HCC): ICD-10-CM

## 2024-03-02 DIAGNOSIS — K21.9 GASTRO-ESOPHAGEAL REFLUX DISEASE WITHOUT ESOPHAGITIS: Chronic | ICD-10-CM

## 2024-03-02 DIAGNOSIS — I10 ESSENTIAL (PRIMARY) HYPERTENSION: ICD-10-CM

## 2024-03-02 RX ORDER — BROMOCRIPTINE MESYLATE 0.8 MG/1
TABLET ORAL
Qty: 450 TABLET | Refills: 1 | Status: SHIPPED | OUTPATIENT
Start: 2024-03-02

## 2024-03-04 RX ORDER — OMEPRAZOLE 20 MG/1
20 CAPSULE, DELAYED RELEASE ORAL DAILY
Qty: 90 CAPSULE | Refills: 0 | Status: SHIPPED | OUTPATIENT
Start: 2024-03-04

## 2024-03-04 RX ORDER — IRBESARTAN 150 MG/1
150 TABLET ORAL NIGHTLY
Qty: 90 TABLET | Refills: 0 | Status: SHIPPED | OUTPATIENT
Start: 2024-03-04

## 2024-03-07 ENCOUNTER — TELEPHONE (OUTPATIENT)
Age: 59
End: 2024-03-07

## 2024-03-07 NOTE — TELEPHONE ENCOUNTER
Patient said he has been on the Metformin for 30 days and now wants the once a week shot medicine. He was told to call our office once he had completed the 30 days. Please call patient for clarification and what medicine he wants to order.

## 2024-05-30 ENCOUNTER — NURSE ONLY (OUTPATIENT)
Age: 59
End: 2024-05-30

## 2024-05-30 DIAGNOSIS — I10 ESSENTIAL (PRIMARY) HYPERTENSION: ICD-10-CM

## 2024-05-30 DIAGNOSIS — E78.2 MIXED HYPERLIPIDEMIA: ICD-10-CM

## 2024-05-30 DIAGNOSIS — E11.65 TYPE 2 DIABETES MELLITUS WITH HYPERGLYCEMIA, UNSPECIFIED WHETHER LONG TERM INSULIN USE (HCC): ICD-10-CM

## 2024-05-30 LAB
CREAT UR-MCNC: 80 MG/DL
MICROALBUMIN UR-MCNC: 11.2 MG/DL
MICROALBUMIN/CREAT UR-RTO: 140 MG/G (ref 0–30)

## 2024-05-31 LAB
ALBUMIN SERPL-MCNC: 3.6 G/DL (ref 3.5–5)
ALBUMIN/GLOB SERPL: 0.9 (ref 1.1–2.2)
ALP SERPL-CCNC: 86 U/L (ref 45–117)
ALT SERPL-CCNC: 19 U/L (ref 12–78)
ANION GAP SERPL CALC-SCNC: 8 MMOL/L (ref 5–15)
AST SERPL-CCNC: 19 U/L (ref 15–37)
BILIRUB SERPL-MCNC: 0.9 MG/DL (ref 0.2–1)
BUN SERPL-MCNC: 9 MG/DL (ref 6–20)
BUN/CREAT SERPL: 10 (ref 12–20)
CALCIUM SERPL-MCNC: 9 MG/DL (ref 8.5–10.1)
CHLORIDE SERPL-SCNC: 105 MMOL/L (ref 97–108)
CHOLEST SERPL-MCNC: 104 MG/DL
CO2 SERPL-SCNC: 25 MMOL/L (ref 21–32)
CREAT SERPL-MCNC: 0.91 MG/DL (ref 0.7–1.3)
EST. AVERAGE GLUCOSE BLD GHB EST-MCNC: 206 MG/DL
GLOBULIN SER CALC-MCNC: 3.8 G/DL (ref 2–4)
GLUCOSE SERPL-MCNC: 168 MG/DL (ref 65–100)
HBA1C MFR BLD: 8.8 % (ref 4–5.6)
HDLC SERPL-MCNC: 31 MG/DL
HDLC SERPL: 3.4 (ref 0–5)
LDLC SERPL CALC-MCNC: 52.4 MG/DL (ref 0–100)
POTASSIUM SERPL-SCNC: 4.2 MMOL/L (ref 3.5–5.1)
PROT SERPL-MCNC: 7.4 G/DL (ref 6.4–8.2)
SODIUM SERPL-SCNC: 138 MMOL/L (ref 136–145)
TRIGL SERPL-MCNC: 103 MG/DL
VLDLC SERPL CALC-MCNC: 20.6 MG/DL

## 2024-06-06 ENCOUNTER — OFFICE VISIT (OUTPATIENT)
Age: 59
End: 2024-06-06
Payer: COMMERCIAL

## 2024-06-06 VITALS
WEIGHT: 289.4 LBS | OXYGEN SATURATION: 96 % | TEMPERATURE: 97.2 F | DIASTOLIC BLOOD PRESSURE: 72 MMHG | HEART RATE: 65 BPM | BODY MASS INDEX: 35.98 KG/M2 | HEIGHT: 75 IN | SYSTOLIC BLOOD PRESSURE: 130 MMHG

## 2024-06-06 DIAGNOSIS — E11.65 TYPE 2 DIABETES MELLITUS WITH HYPERGLYCEMIA, UNSPECIFIED WHETHER LONG TERM INSULIN USE (HCC): Primary | ICD-10-CM

## 2024-06-06 DIAGNOSIS — E78.2 MIXED HYPERLIPIDEMIA: ICD-10-CM

## 2024-06-06 DIAGNOSIS — I10 ESSENTIAL (PRIMARY) HYPERTENSION: ICD-10-CM

## 2024-06-06 PROCEDURE — 99214 OFFICE O/P EST MOD 30 MIN: CPT | Performed by: INTERNAL MEDICINE

## 2024-06-06 PROCEDURE — 3075F SYST BP GE 130 - 139MM HG: CPT | Performed by: INTERNAL MEDICINE

## 2024-06-06 PROCEDURE — 3052F HG A1C>EQUAL 8.0%<EQUAL 9.0%: CPT | Performed by: INTERNAL MEDICINE

## 2024-06-06 PROCEDURE — 3078F DIAST BP <80 MM HG: CPT | Performed by: INTERNAL MEDICINE

## 2024-06-06 NOTE — PROGRESS NOTES
>90 05/30/2024    GFRAA >60 10/01/2021    AGRATIO 1.1 03/31/2023    GLOB 3.8 05/30/2024    CHOL 104 05/30/2024    TRIG 103 05/30/2024    LDL 52.4 05/30/2024    HDL 31 05/30/2024       Lab Results   Component Value Date    MALBCR 140 (H) 05/30/2024          ASSESSMENT and PLAN     1. Type 2 DM poorly  controlled : a1c is    8.8 %     from   May 2024    compared   to   9.9 %      from jan 2024    compared to   8 %   Iin  2023   9.2 %     from  sept 2022   compared to   7.6%     From     Jan 2022    Compared to   Over 10   %      From   October 2021  ( STEROID USE )   compared to    7.8 % from   Today by  POC   Compared to  9.1 %     From    Jan 2020     compared to   10.9 %      From      By POC,   Oct 2019    Compared to     12.2 %     From  August 2019    Compared to       ??    9.4 %  From august 2018      COMPARED TO    9.7 %   From June 2018     Compared to over 11 %  From feb 2018 June 2024     Improved DM  2 control    Stay on glipizide, stopped janumet xr  because of insurance blocking it being inj incretin  So he is now on metformin sr ,  ozempic , jardiance , cycloset     On markie 3 , some trouble - could not download   He will be retiring  from  his  job and says he can start on insulin   HE MIGHT NEED INSULINS TO CONTROL but he is on [employer] license because of which  he is deferring use of insulin        Jan 2024     A long gap in follow up ( he does this , gives gaps in follow ups )   Losing control   Stay on glipizide, janumet xr, trulicity , jardiance , cycloset    Says trulicity is not refilled   No log or meter to go by   He just speaks  of numbers  , fasting sugars are high  per him    There is not much to show  and explain     He will be retiring  from  his  job and says he can start on insulin   He requested markie 3 and I ordered         Oct 2022    Losing control because of lack of focus - drinking ETOH    Stay on glipizide, janumet xr, trulicity , jardiance ,

## 2024-06-06 NOTE — PATIENT INSTRUCTIONS
SPECIFIC INSTRUCTIONS BELOW        glipizide   10  mg twice a day, twenty min before b-fast and dinner   (Do not take it if you are not eating   Cut the pill to half if eating lesser than normal   Do not avoid lunches )      cycloset  0.8 mg To    5 pills   EVERY DAY  within 2 hours of getting up in the morning     Metformin  sr  750 mg twice a day with meals      StaY   jardiance 25   Mg , right before   b-fast    ( drink plenty of water )     Stay  on  ozempic   at 1  mg a week             -------------PAY ATTENTION TO THESE GENERAL INSTRUCTIONS -----------------      - The medications prescribed at this visit will not be available at pharmacy until 6 pm today        - your med list is not updated until the visit encounter is closed, so FOLLOW THE TYPED SPECIFIC INSTRUCTIONS  ABOVE     -ANY tests other than blood work, which you opt to do  outside the  [facility] imaging facilities, you are responsible for prior authorizations if  required    - health maintenance will not be updated  on AVS, so please ignore it       Results     *Normal results will not be notified by a phone call starting January 1 2024   *If you have an upcoming visit, the results will be discussed at the visit   *Please sign up for MY CHART if you want access to your lab and test results  *Abnormal results which require immediate attention will be notified by phone call   *Abnormal results which do not require immediate assistance will be notified in 1-2 weeks       Refills    -    have your pharmacy send us a refill request . Refills are done max for one year and a visit is a must before refills are extended    Follow up appointments -  highly encourage you to make it when you are checking out. We can accommodate you into the schedule based on your clinical situation, but not for extending refills beyond a year. Labs are important to give refills and it is important to get labs before the visit     Phone calls  -  Allow  24 hrs. for

## 2024-08-19 DIAGNOSIS — I10 ESSENTIAL (PRIMARY) HYPERTENSION: ICD-10-CM

## 2024-08-20 RX ORDER — IRBESARTAN 150 MG/1
150 TABLET ORAL NIGHTLY
Qty: 90 TABLET | Refills: 0 | Status: SHIPPED | OUTPATIENT
Start: 2024-08-20

## 2024-09-06 ENCOUNTER — TELEPHONE (OUTPATIENT)
Dept: PRIMARY CARE CLINIC | Facility: CLINIC | Age: 59
End: 2024-09-06

## 2024-09-06 ENCOUNTER — OFFICE VISIT (OUTPATIENT)
Dept: PRIMARY CARE CLINIC | Facility: CLINIC | Age: 59
End: 2024-09-06
Payer: COMMERCIAL

## 2024-09-06 VITALS
HEIGHT: 75 IN | RESPIRATION RATE: 16 BRPM | DIASTOLIC BLOOD PRESSURE: 88 MMHG | OXYGEN SATURATION: 97 % | BODY MASS INDEX: 36.06 KG/M2 | HEART RATE: 83 BPM | WEIGHT: 290 LBS | TEMPERATURE: 97.6 F | SYSTOLIC BLOOD PRESSURE: 163 MMHG

## 2024-09-06 DIAGNOSIS — Z01.810 PREOP CARDIOVASCULAR EXAM: ICD-10-CM

## 2024-09-06 DIAGNOSIS — R05.1 ACUTE COUGH: ICD-10-CM

## 2024-09-06 DIAGNOSIS — R05.1 ACUTE COUGH: Primary | ICD-10-CM

## 2024-09-06 DIAGNOSIS — E66.01 SEVERE OBESITY (BMI 35.0-39.9) WITH COMORBIDITY (HCC): ICD-10-CM

## 2024-09-06 PROCEDURE — 3077F SYST BP >= 140 MM HG: CPT | Performed by: FAMILY MEDICINE

## 2024-09-06 PROCEDURE — 99213 OFFICE O/P EST LOW 20 MIN: CPT | Performed by: FAMILY MEDICINE

## 2024-09-06 PROCEDURE — 3079F DIAST BP 80-89 MM HG: CPT | Performed by: FAMILY MEDICINE

## 2024-09-06 RX ORDER — AZITHROMYCIN 250 MG/1
TABLET, FILM COATED ORAL
Qty: 6 TABLET | Refills: 0 | Status: SHIPPED | OUTPATIENT
Start: 2024-09-06 | End: 2024-09-16

## 2024-09-06 RX ORDER — AZITHROMYCIN 250 MG/1
TABLET, FILM COATED ORAL
Qty: 6 TABLET | Refills: 0 | Status: SHIPPED | OUTPATIENT
Start: 2024-09-06 | End: 2024-09-06 | Stop reason: SDUPTHER

## 2024-09-06 ASSESSMENT — PATIENT HEALTH QUESTIONNAIRE - PHQ9
SUM OF ALL RESPONSES TO PHQ QUESTIONS 1-9: 0
SUM OF ALL RESPONSES TO PHQ QUESTIONS 1-9: 0
2. FEELING DOWN, DEPRESSED OR HOPELESS: NOT AT ALL
1. LITTLE INTEREST OR PLEASURE IN DOING THINGS: NOT AT ALL
SUM OF ALL RESPONSES TO PHQ QUESTIONS 1-9: 0
SUM OF ALL RESPONSES TO PHQ9 QUESTIONS 1 & 2: 0
SUM OF ALL RESPONSES TO PHQ QUESTIONS 1-9: 0

## 2024-09-06 NOTE — TELEPHONE ENCOUNTER
Pharmacy that he got his antibiotic prescription is sent to is temporary closed and he wanted to know if the antibiotic could be sent to the [facility] on [facility].

## 2024-09-06 NOTE — PROGRESS NOTES
Deon Foote (:  1965) is a 59 y.o. male,Established patient, here for evaluation of the following chief complaint(s):  Other (Cataract pre-op)         Assessment & Plan      Pre-op for L cataract surgery - form filled and cleared for surgery    Cough and chest congestion- prescribed azithromycin, 250mg, oral, daily for 2-5 days; take 500 mg on day 1    No follow-ups on file.       Subjective   HPI    Pre-op for L cataract surgery- wants to be cleared for surgery    Cough and chest congestion- started 2 weeks ago; pt stated that his mucus has a yellow-green color; pt stated that it affects his breathing; pt denies fever; pt stated that he took mucinex but it only helps him sleep at night but it has not cured his cough/congestion; pt stated that he had a course of steroid treatment around -2024 and he stated that it worked back then; pt stated that he took benzonatate \"a while back\" and it worked then as well but he has not taken it since; pt stated that he had to cut back on exercise because of his cough/congestion; pt would like an antibiotic    Review of Systems       Objective   Physical Exam  Vitals reviewed.   Constitutional:       Appearance: Normal appearance.   Cardiovascular:      Rate and Rhythm: Normal rate and regular rhythm.      Heart sounds: Normal heart sounds.   Pulmonary:      Effort: Pulmonary effort is normal.      Breath sounds: Normal breath sounds.   Neurological:      General: No focal deficit present.      Mental Status: He is alert and oriented to person, place, and time.                  An electronic signature was used to authenticate this note.    --Chelsi Costa

## 2024-09-06 NOTE — PROGRESS NOTES
\"Have you been to the ER, urgent care clinic since your last visit?  Hospitalized since your last visit?\"    NO    “Have you seen or consulted any other health care providers outside of [facility] since your last visit?”    NO    “Have you had a colorectal cancer screening such as a colonoscopy/FIT/Cologuard?    NO    Date of last Colonoscopy: 1/13/2017  No cologuard on file  No FIT/FOBT on file   No flexible sigmoidoscopy on file

## 2024-09-06 NOTE — PROGRESS NOTES
Subjective  Chief Complaint   Patient presents with    Other     Cataract pre-op     HPI:  Deon Foote is a 59 y.o. male.    Patient presents today for preop for cataract surgery on 10/1/2024.  He is doing well other than a cough he has had for the last 2 weeks.  He reports that has overall gotten a bit better, but given that he still has productive cough he is concerned that he needs an antibiotic.  He reports he had some improvement with benzonatate and Mucinex.  He has been improving his diet and exercise, though the cough has interfered with his exercise recently.  His blood pressure is a bit elevated today, though has been normal the last 4 readings.    Past Medical History:   Diagnosis Date    Allergies     Diabetes (HCC)     Gastroesophageal reflux disease without esophagitis 12/10/2020    GERD (gastroesophageal reflux disease)     Hypercholesterolemia     Hyperlipidemia 12/10/2020    Hypertension     Osteoarthritis 12/10/2020    Shoulder pain 12/10/2020     Current Outpatient Medications on File Prior to Visit   Medication Sig Dispense Refill    irbesartan (AVAPRO) 150 MG tablet TAKE 1 TABLET BY MOUTH EVERY NIGHT 90 tablet 0    omeprazole (PRILOSEC) 20 MG delayed release capsule TAKE 1 CAPSULE BY MOUTH DAILY 90 capsule 0    CYCLOSET 0.8 MG TABS 5 TABLETS BY MOUTH WITHIN 2 HOURS OF WAKING UP IN THE MORNING 450 tablet 1    simvastatin (ZOCOR) 20 MG tablet TAKE 1 TABLET BY MOUTH EVERY NIGHT 90 tablet 1    carvedilol (COREG) 12.5 MG tablet TAKE 1 TABLET BY MOUTH TWICE DAILY 180 tablet 1    metFORMIN (GLUCOPHAGE-XR) 750 MG extended release tablet One pill twice  a  day   STOP  janumet  xr 180 tablet 3    Semaglutide, 1 MG/DOSE, (OZEMPIC, 1 MG/DOSE,) 4 MG/3ML SOPN 1 mg a  week 9 mL 2    Continuous Blood Gluc Sensor (FREESTYLE MENDEZ 3 SENSOR) [facility] Use to check BG 4 times daily. Dx code: E11.65 6 each 3    glipiZIDE (GLUCOTROL) 10 MG tablet TAKE 1 TABLET BY MOUTH 20 MINUTES BEFORE BREAKFAST AND 20 MINUTES BEFORE

## 2024-09-13 ENCOUNTER — TELEPHONE (OUTPATIENT)
Dept: PRIMARY CARE CLINIC | Facility: CLINIC | Age: 59
End: 2024-09-13

## 2024-09-13 DIAGNOSIS — R05.1 ACUTE COUGH: Primary | ICD-10-CM

## 2024-09-16 RX ORDER — DOXYCYCLINE HYCLATE 100 MG
100 TABLET ORAL 2 TIMES DAILY
Qty: 14 TABLET | Refills: 0 | Status: SHIPPED | OUTPATIENT
Start: 2024-09-16 | End: 2024-09-23

## 2024-10-10 ENCOUNTER — TELEMEDICINE (OUTPATIENT)
Dept: PRIMARY CARE CLINIC | Facility: CLINIC | Age: 59
End: 2024-10-10
Payer: COMMERCIAL

## 2024-10-10 DIAGNOSIS — K21.9 GASTRO-ESOPHAGEAL REFLUX DISEASE WITHOUT ESOPHAGITIS: Chronic | ICD-10-CM

## 2024-10-10 DIAGNOSIS — E78.2 MIXED HYPERLIPIDEMIA: ICD-10-CM

## 2024-10-10 DIAGNOSIS — I10 ESSENTIAL (PRIMARY) HYPERTENSION: ICD-10-CM

## 2024-10-10 DIAGNOSIS — I50.9 CHRONIC CONGESTIVE HEART FAILURE, UNSPECIFIED HEART FAILURE TYPE (HCC): Primary | ICD-10-CM

## 2024-10-10 DIAGNOSIS — T78.40XA ALLERGY, INITIAL ENCOUNTER: ICD-10-CM

## 2024-10-10 PROCEDURE — 99214 OFFICE O/P EST MOD 30 MIN: CPT | Performed by: NURSE PRACTITIONER

## 2024-10-10 RX ORDER — ALBUTEROL SULFATE 90 UG/1
1 INHALANT RESPIRATORY (INHALATION) EVERY 6 HOURS PRN
Qty: 18 G | Refills: 1 | Status: SHIPPED | OUTPATIENT
Start: 2024-10-10

## 2024-10-10 RX ORDER — FUROSEMIDE 40 MG
40 TABLET ORAL
COMMUNITY
Start: 2024-09-29

## 2024-10-10 RX ORDER — DAPAGLIFLOZIN 10 MG/1
TABLET, FILM COATED ORAL
COMMUNITY
Start: 2024-09-30

## 2024-10-10 RX ORDER — CARVEDILOL 12.5 MG/1
12.5 TABLET ORAL 2 TIMES DAILY
Qty: 180 TABLET | Refills: 1 | Status: SHIPPED | OUTPATIENT
Start: 2024-10-10

## 2024-10-10 RX ORDER — SIMVASTATIN 20 MG
20 TABLET ORAL NIGHTLY
Qty: 90 TABLET | Refills: 1 | Status: SHIPPED | OUTPATIENT
Start: 2024-10-10

## 2024-10-10 RX ORDER — SACUBITRIL AND VALSARTAN 24; 26 MG/1; MG/1
TABLET, FILM COATED ORAL
COMMUNITY
Start: 2024-09-30

## 2024-10-10 SDOH — ECONOMIC STABILITY: FOOD INSECURITY: WITHIN THE PAST 12 MONTHS, THE FOOD YOU BOUGHT JUST DIDN'T LAST AND YOU DIDN'T HAVE MONEY TO GET MORE.: NEVER TRUE

## 2024-10-10 SDOH — ECONOMIC STABILITY: FOOD INSECURITY: WITHIN THE PAST 12 MONTHS, YOU WORRIED THAT YOUR FOOD WOULD RUN OUT BEFORE YOU GOT MONEY TO BUY MORE.: NEVER TRUE

## 2024-10-10 SDOH — ECONOMIC STABILITY: INCOME INSECURITY: HOW HARD IS IT FOR YOU TO PAY FOR THE VERY BASICS LIKE FOOD, HOUSING, MEDICAL CARE, AND HEATING?: NOT HARD AT ALL

## 2024-10-10 ASSESSMENT — PATIENT HEALTH QUESTIONNAIRE - PHQ9
SUM OF ALL RESPONSES TO PHQ QUESTIONS 1-9: 0
SUM OF ALL RESPONSES TO PHQ QUESTIONS 1-9: 0
SUM OF ALL RESPONSES TO PHQ9 QUESTIONS 1 & 2: 0
1. LITTLE INTEREST OR PLEASURE IN DOING THINGS: NOT AT ALL
2. FEELING DOWN, DEPRESSED OR HOPELESS: NOT AT ALL
SUM OF ALL RESPONSES TO PHQ QUESTIONS 1-9: 0
SUM OF ALL RESPONSES TO PHQ QUESTIONS 1-9: 0

## 2024-10-10 ASSESSMENT — ENCOUNTER SYMPTOMS: SHORTNESS OF BREATH: 0

## 2024-10-10 NOTE — PROGRESS NOTES
Chief Complaint   Patient presents with    Medication Refill       
or dizziness.      CHF: Patient reported he is recently dx with CHF by cardiologist, Dr. Shabazz.   Patient reported that his EF on recent ECHO was 30%.  He was started on entresto, lasix and spironolactone.    He will be having studies to evaluate for blockage as cause.     Diabetes: Last A1c was   Hemoglobin A1C   Date Value Ref Range Status   05/30/2024 8.8 (H) 4.0 - 5.6 % Final     Comment:     (NOTE)  HbA1C Interpretive Ranges  <5.7              Normal  5.7 - 6.4         Consider Prediabetes  >6.5              Consider Diabetes       Hemoglobin A1C, POC   Date Value Ref Range Status   11/19/2020 7.8 % Final    Patient is now following again with endocrinology.     Patient reported he had repeat colonoscopy earlier this year with Dr. Thompson.  Will request records.       Review of Systems   Constitutional:  Negative for fatigue.   Eyes:  Negative for visual disturbance.   Respiratory:  Negative for shortness of breath.    Cardiovascular:  Negative for chest pain, palpitations and leg swelling.   Neurological:  Negative for dizziness, weakness and headaches.   Psychiatric/Behavioral:  Negative for sleep disturbance. The patient is not nervous/anxious.           Objective:     There were no vitals filed for this visit.   There is no height or weight on file to calculate BMI.     Physical Exam  Constitutional:       Appearance: Normal appearance. He is obese.   HENT:      Head: Normocephalic.   Eyes:      Conjunctiva/sclera: Conjunctivae normal.   Pulmonary:      Effort: Pulmonary effort is normal.   Skin:     Coloration: Skin is not pale.   Neurological:      Mental Status: He is alert and oriented to person, place, and time.   Psychiatric:         Mood and Affect: Mood normal.         Behavior: Behavior normal.          Allergies   Allergen Reactions    Penicillins Hives       Current Outpatient Medications   Medication Sig Dispense Refill    furosemide (LASIX) 40 MG tablet Take 1 tablet by mouth      ENTRESTO 24-26

## 2024-10-21 ENCOUNTER — LAB (OUTPATIENT)
Age: 59
End: 2024-10-21

## 2024-10-21 DIAGNOSIS — E78.2 MIXED HYPERLIPIDEMIA: ICD-10-CM

## 2024-10-21 DIAGNOSIS — I10 ESSENTIAL (PRIMARY) HYPERTENSION: ICD-10-CM

## 2024-10-21 DIAGNOSIS — E11.65 TYPE 2 DIABETES MELLITUS WITH HYPERGLYCEMIA, UNSPECIFIED WHETHER LONG TERM INSULIN USE (HCC): ICD-10-CM

## 2024-10-22 LAB
ALBUMIN SERPL-MCNC: 3.2 G/DL (ref 3.5–5)
ALBUMIN/GLOB SERPL: 0.9 (ref 1.1–2.2)
ALP SERPL-CCNC: 73 U/L (ref 45–117)
ALT SERPL-CCNC: 21 U/L (ref 12–78)
ANION GAP SERPL CALC-SCNC: 5 MMOL/L (ref 2–12)
AST SERPL-CCNC: 20 U/L (ref 15–37)
BILIRUB SERPL-MCNC: 0.9 MG/DL (ref 0.2–1)
BUN SERPL-MCNC: 13 MG/DL (ref 6–20)
BUN/CREAT SERPL: 15 (ref 12–20)
CALCIUM SERPL-MCNC: 8.6 MG/DL (ref 8.5–10.1)
CHLORIDE SERPL-SCNC: 105 MMOL/L (ref 97–108)
CHOLEST SERPL-MCNC: 115 MG/DL
CO2 SERPL-SCNC: 29 MMOL/L (ref 21–32)
CREAT SERPL-MCNC: 0.86 MG/DL (ref 0.7–1.3)
CREAT UR-MCNC: 79.8 MG/DL
EST. AVERAGE GLUCOSE BLD GHB EST-MCNC: 171 MG/DL
GLOBULIN SER CALC-MCNC: 3.7 G/DL (ref 2–4)
GLUCOSE SERPL-MCNC: 174 MG/DL (ref 65–100)
HBA1C MFR BLD: 7.6 % (ref 4–5.6)
HDLC SERPL-MCNC: 28 MG/DL
HDLC SERPL: 4.1 (ref 0–5)
LDLC SERPL CALC-MCNC: 64.2 MG/DL (ref 0–100)
MICROALBUMIN UR-MCNC: 13.2 MG/DL
MICROALBUMIN/CREAT UR-RTO: 165 MG/G (ref 0–30)
POTASSIUM SERPL-SCNC: 4.1 MMOL/L (ref 3.5–5.1)
PROT SERPL-MCNC: 6.9 G/DL (ref 6.4–8.2)
SODIUM SERPL-SCNC: 139 MMOL/L (ref 136–145)
TRIGL SERPL-MCNC: 114 MG/DL
VLDLC SERPL CALC-MCNC: 22.8 MG/DL

## 2024-10-31 ENCOUNTER — OFFICE VISIT (OUTPATIENT)
Age: 59
End: 2024-10-31

## 2024-10-31 VITALS
DIASTOLIC BLOOD PRESSURE: 89 MMHG | HEART RATE: 81 BPM | OXYGEN SATURATION: 95 % | WEIGHT: 287.2 LBS | SYSTOLIC BLOOD PRESSURE: 140 MMHG | HEIGHT: 75 IN | BODY MASS INDEX: 35.71 KG/M2

## 2024-10-31 DIAGNOSIS — E78.2 MIXED HYPERLIPIDEMIA: ICD-10-CM

## 2024-10-31 DIAGNOSIS — I10 ESSENTIAL (PRIMARY) HYPERTENSION: ICD-10-CM

## 2024-10-31 DIAGNOSIS — E11.65 TYPE 2 DIABETES MELLITUS WITH HYPERGLYCEMIA, UNSPECIFIED WHETHER LONG TERM INSULIN USE (HCC): Primary | ICD-10-CM

## 2024-10-31 RX ORDER — DAPAGLIFLOZIN 10 MG/1
TABLET, FILM COATED ORAL
Qty: 90 TABLET | Refills: 1 | Status: SHIPPED | OUTPATIENT
Start: 2024-10-31

## 2024-10-31 RX ORDER — METFORMIN HYDROCHLORIDE 750 MG/1
TABLET, EXTENDED RELEASE ORAL
Qty: 180 TABLET | Refills: 3 | Status: SHIPPED | OUTPATIENT
Start: 2024-10-31

## 2024-10-31 RX ORDER — BROMOCRIPTINE MESYLATE 0.8 MG/1
TABLET ORAL
Qty: 450 TABLET | Refills: 1 | Status: SHIPPED | OUTPATIENT
Start: 2024-10-31

## 2024-10-31 RX ORDER — GLIPIZIDE 10 MG/1
TABLET ORAL
Qty: 180 TABLET | Refills: 3 | Status: SHIPPED | OUTPATIENT
Start: 2024-10-31

## 2024-10-31 RX ORDER — SEMAGLUTIDE 1.34 MG/ML
INJECTION, SOLUTION SUBCUTANEOUS
Qty: 9 ML | Refills: 2 | Status: SHIPPED | OUTPATIENT
Start: 2024-10-31

## 2024-10-31 NOTE — PROGRESS NOTES
Patient identified by name and date of birth  Deon Foote is a 59 y.o. male   Chief Complaint   Patient presents with    Diabetes     Type 2 diabetes mellitus with hyperglycemia, unspecified whether long term insulin use ([facility])      Vitals:    10/31/24 0909   BP: (!) 140/89   Site: Left Upper Arm   Pulse: 81   SpO2: 95%   Weight: 130.3 kg (287 lb 3.2 oz)   Height: 1.905 m (6' 3\")       No LMP for male patient.       \"Have you been to the ER, urgent care clinic since your last visit?  Hospitalized since your last visit?\"    NO    “Have you seen or consulted any other health care providers outside of [facility] since your last visit?”    NO

## 2024-10-31 NOTE — PROGRESS NOTES
[facility] DIABETES AND ENDOCRINOLOGY              Marivel Lomeli MD FACE           HISTORY OF PRESENT ILLNESS   Deon Foote is a 59  y.o.  male.   Patient here for   f/u after last  visit of Type 2 diabetes mellitus   From June 2024      Lost 2 lbs   Using markie   Going for cardiac cath  in first week of  Nov 2024 June 2024     On markie 3   Lost 20 lbs   Doing better         Feb 2024     A gap of 14 months   He has no clear reason not to follow up   He reports that he is taking  all the meds but for trulicity   He likes to use markie 3       October 2022      He says he is doing  \" so -- so \"    His wife had back surgery, several deaths in family    No focusing  on diabetes    He drinks more ETOH          Jan 2022       A long gap of 1.5 years almost    Lost many of relatives to COVID          Old history :   Referred : by self/pcp   H/o diabetes for many years    Current A1C is 11.6 %  From feb 2018  and symptoms/problems include polyuria, polydipsia and visual disturbances    Current diabetic medications include intensive insulin injection program.    Current monitoring regimen: home blood tests - 2 times daily   Home blood sugar records: trend: fluctuating a lot   Any episodes of hypoglycemia? yes - multiple         Review of Systems    Psychiatric/Behavioral: Negative for depression and memory loss. The patient does not have insomnia.           Physical Exam    Constitutional: He is oriented to person, place, and time. He appears well-developed and well-nourished.    EDENTULOUS    Psychiatric: He has a normal mood and affect.         Labs      Lab Results   Component Value Date    LABA1C 7.6 (H) 10/21/2024    LABA1C 8.8 (H) 05/30/2024    LABA1C 9.9 (H) 01/17/2024       Lab Results   Component Value Date     10/21/2024    K 4.1 10/21/2024     10/21/2024    CO2 29 10/21/2024    BUN 13 10/21/2024    CREATININE 0.86 10/21/2024    GLUCOSE 174 (H) 10/21/2024    CALCIUM 8.6 10/21/2024

## 2024-10-31 NOTE — PATIENT INSTRUCTIONS
be notified by a phone call starting January 1 2024   *If you have an upcoming visit, the results will be discussed at the visit   *Please sign up for MY CHART if you want access to your lab and test results  *Abnormal results which require immediate attention will be notified by phone call   *Abnormal results which do not require immediate assistance will be notified in 1-2 weeks       Refills    -    have your pharmacy send us a refill request . Refills are done max for one year and a visit is a must before refills are extended    Follow up appointments -  highly encourage you to make it when you are checking out. We can accommodate you into the schedule based on your clinical situation, but not for extending refills beyond a year. Labs are important to give refills and it is important to get labs before the visit     Phone calls  -  Allow  24 hrs. for non-urgent calls to be returned  Prior authorization - It may take 2 to 4 weeks to process  Forms  -  FMLA, DMV etc., will take up to 2 weeks to process  Cancellations - please notify the office 2 days in advance   Samples  - will only be dispensed at visits       If not showing up for the appointment and/ or  cancelling appointment within 24 hours are kept track of and three such situations in  two consecutive years will likely be considered for termination from the practice    -------------------------------------------------------------------------------------------------------------------

## 2024-11-04 ENCOUNTER — HOSPITAL ENCOUNTER (OUTPATIENT)
Facility: HOSPITAL | Age: 59
Discharge: HOME | End: 2024-11-07
Payer: COMMERCIAL

## 2024-11-04 VITALS
DIASTOLIC BLOOD PRESSURE: 94 MMHG | RESPIRATION RATE: 16 BRPM | HEIGHT: 76 IN | BODY MASS INDEX: 35.17 KG/M2 | TEMPERATURE: 97.8 F | HEART RATE: 81 BPM | WEIGHT: 288.8 LBS | SYSTOLIC BLOOD PRESSURE: 131 MMHG

## 2024-11-04 LAB
ALBUMIN SERPL-MCNC: 3.1 G/DL (ref 3.5–5)
ALBUMIN/GLOB SERPL: 0.8 (ref 1.1–2.2)
ALP SERPL-CCNC: 80 U/L (ref 45–117)
ALT SERPL-CCNC: 35 U/L (ref 12–78)
ANION GAP SERPL CALC-SCNC: 4 MMOL/L (ref 2–12)
APTT PPP: 28.1 SEC (ref 21.2–34.1)
AST SERPL W P-5'-P-CCNC: 25 U/L (ref 15–37)
BILIRUB SERPL-MCNC: 1.2 MG/DL (ref 0.2–1)
BUN SERPL-MCNC: 14 MG/DL (ref 6–20)
BUN/CREAT SERPL: 16 (ref 12–20)
CA-I BLD-MCNC: 8.5 MG/DL (ref 8.5–10.1)
CHLORIDE SERPL-SCNC: 105 MMOL/L (ref 97–108)
CO2 SERPL-SCNC: 31 MMOL/L (ref 21–32)
CREAT SERPL-MCNC: 0.87 MG/DL (ref 0.7–1.3)
ERYTHROCYTE [DISTWIDTH] IN BLOOD BY AUTOMATED COUNT: 14.9 % (ref 11.5–14.5)
GLOBULIN SER CALC-MCNC: 3.9 G/DL (ref 2–4)
GLUCOSE SERPL-MCNC: 140 MG/DL (ref 65–100)
HCT VFR BLD AUTO: 47.4 % (ref 36.6–50.3)
HGB BLD-MCNC: 14.8 G/DL (ref 12.1–17)
INR PPP: 1 (ref 0.9–1.1)
MCH RBC QN AUTO: 25.9 PG (ref 26–34)
MCHC RBC AUTO-ENTMCNC: 31.2 G/DL (ref 30–36.5)
MCV RBC AUTO: 82.9 FL (ref 80–99)
NRBC # BLD: 0 K/UL (ref 0–0.01)
NRBC BLD-RTO: 0 PER 100 WBC
PLATELET # BLD AUTO: 187 K/UL (ref 150–400)
PMV BLD AUTO: 10.5 FL (ref 8.9–12.9)
POTASSIUM SERPL-SCNC: 3.7 MMOL/L (ref 3.5–5.1)
PROT SERPL-MCNC: 7 G/DL (ref 6.4–8.2)
PROTHROMBIN TIME: 13 SEC (ref 11.9–14.6)
RBC # BLD AUTO: 5.72 M/UL (ref 4.1–5.7)
SODIUM SERPL-SCNC: 140 MMOL/L (ref 136–145)
THERAPEUTIC RANGE: NORMAL SEC (ref 82–109)
WBC # BLD AUTO: 7.3 K/UL (ref 4.1–11.1)

## 2024-11-04 PROCEDURE — 85730 THROMBOPLASTIN TIME PARTIAL: CPT

## 2024-11-04 PROCEDURE — 80053 COMPREHEN METABOLIC PANEL: CPT

## 2024-11-04 PROCEDURE — 93005 ELECTROCARDIOGRAM TRACING: CPT | Performed by: STUDENT IN AN ORGANIZED HEALTH CARE EDUCATION/TRAINING PROGRAM

## 2024-11-04 PROCEDURE — 36415 COLL VENOUS BLD VENIPUNCTURE: CPT

## 2024-11-04 PROCEDURE — 85027 COMPLETE CBC AUTOMATED: CPT

## 2024-11-04 PROCEDURE — 85610 PROTHROMBIN TIME: CPT

## 2024-11-05 LAB
EKG ATRIAL RATE: 65 BPM
EKG DIAGNOSIS: NORMAL
EKG P AXIS: 41 DEGREES
EKG P-R INTERVAL: 164 MS
EKG Q-T INTERVAL: 430 MS
EKG QRS DURATION: 144 MS
EKG QTC CALCULATION (BAZETT): 447 MS
EKG R AXIS: -45 DEGREES
EKG T AXIS: -32 DEGREES
EKG VENTRICULAR RATE: 65 BPM

## 2024-11-08 ENCOUNTER — HOSPITAL ENCOUNTER (OUTPATIENT)
Facility: HOSPITAL | Age: 59
Discharge: HOME | End: 2024-11-08
Attending: STUDENT IN AN ORGANIZED HEALTH CARE EDUCATION/TRAINING PROGRAM | Admitting: STUDENT IN AN ORGANIZED HEALTH CARE EDUCATION/TRAINING PROGRAM
Payer: COMMERCIAL

## 2024-11-08 VITALS
OXYGEN SATURATION: 93 % | HEART RATE: 71 BPM | DIASTOLIC BLOOD PRESSURE: 85 MMHG | SYSTOLIC BLOOD PRESSURE: 136 MMHG | TEMPERATURE: 98 F | RESPIRATION RATE: 18 BRPM

## 2024-11-08 DIAGNOSIS — I10 ESSENTIAL (PRIMARY) HYPERTENSION: ICD-10-CM

## 2024-11-08 DIAGNOSIS — E11.65 TYPE 2 DIABETES MELLITUS WITH HYPERGLYCEMIA, UNSPECIFIED WHETHER LONG TERM INSULIN USE (HCC): ICD-10-CM

## 2024-11-08 DIAGNOSIS — E78.2 MIXED HYPERLIPIDEMIA: ICD-10-CM

## 2024-11-08 DIAGNOSIS — I42.9 CARDIOMYOPATHY (HCC): ICD-10-CM

## 2024-11-08 LAB
GLUCOSE BLD STRIP.AUTO-MCNC: 194 MG/DL (ref 65–100)
PERFORMED BY:: ABNORMAL

## 2024-11-08 PROCEDURE — C1894 INTRO/SHEATH, NON-LASER: HCPCS | Performed by: STUDENT IN AN ORGANIZED HEALTH CARE EDUCATION/TRAINING PROGRAM

## 2024-11-08 PROCEDURE — 6360000004 HC RX CONTRAST MEDICATION: Performed by: STUDENT IN AN ORGANIZED HEALTH CARE EDUCATION/TRAINING PROGRAM

## 2024-11-08 PROCEDURE — 99152 MOD SED SAME PHYS/QHP 5/>YRS: CPT | Performed by: STUDENT IN AN ORGANIZED HEALTH CARE EDUCATION/TRAINING PROGRAM

## 2024-11-08 PROCEDURE — 7100000011 HC PHASE II RECOVERY - ADDTL 15 MIN: Performed by: STUDENT IN AN ORGANIZED HEALTH CARE EDUCATION/TRAINING PROGRAM

## 2024-11-08 PROCEDURE — 7100000010 HC PHASE II RECOVERY - FIRST 15 MIN: Performed by: STUDENT IN AN ORGANIZED HEALTH CARE EDUCATION/TRAINING PROGRAM

## 2024-11-08 PROCEDURE — 82962 GLUCOSE BLOOD TEST: CPT

## 2024-11-08 PROCEDURE — 2709999900 HC NON-CHARGEABLE SUPPLY: Performed by: STUDENT IN AN ORGANIZED HEALTH CARE EDUCATION/TRAINING PROGRAM

## 2024-11-08 PROCEDURE — 7100000001 HC PACU RECOVERY - ADDTL 15 MIN: Performed by: STUDENT IN AN ORGANIZED HEALTH CARE EDUCATION/TRAINING PROGRAM

## 2024-11-08 PROCEDURE — C1769 GUIDE WIRE: HCPCS | Performed by: STUDENT IN AN ORGANIZED HEALTH CARE EDUCATION/TRAINING PROGRAM

## 2024-11-08 PROCEDURE — 2500000003 HC RX 250 WO HCPCS: Performed by: STUDENT IN AN ORGANIZED HEALTH CARE EDUCATION/TRAINING PROGRAM

## 2024-11-08 PROCEDURE — 6360000002 HC RX W HCPCS: Performed by: STUDENT IN AN ORGANIZED HEALTH CARE EDUCATION/TRAINING PROGRAM

## 2024-11-08 PROCEDURE — 7100000000 HC PACU RECOVERY - FIRST 15 MIN: Performed by: STUDENT IN AN ORGANIZED HEALTH CARE EDUCATION/TRAINING PROGRAM

## 2024-11-08 PROCEDURE — 93458 L HRT ARTERY/VENTRICLE ANGIO: CPT | Performed by: STUDENT IN AN ORGANIZED HEALTH CARE EDUCATION/TRAINING PROGRAM

## 2024-11-08 RX ORDER — ACETAMINOPHEN 325 MG/1
650 TABLET ORAL EVERY 4 HOURS PRN
Status: DISCONTINUED | OUTPATIENT
Start: 2024-11-08 | End: 2024-11-08 | Stop reason: HOSPADM

## 2024-11-08 RX ORDER — IOPAMIDOL 755 MG/ML
INJECTION, SOLUTION INTRAVASCULAR PRN
Status: DISCONTINUED | OUTPATIENT
Start: 2024-11-08 | End: 2024-11-08 | Stop reason: HOSPADM

## 2024-11-08 RX ORDER — FENTANYL CITRATE 50 UG/ML
INJECTION, SOLUTION INTRAMUSCULAR; INTRAVENOUS PRN
Status: DISCONTINUED | OUTPATIENT
Start: 2024-11-08 | End: 2024-11-08 | Stop reason: HOSPADM

## 2024-11-08 RX ORDER — LIDOCAINE HYDROCHLORIDE 10 MG/ML
INJECTION, SOLUTION INFILTRATION; PERINEURAL PRN
Status: DISCONTINUED | OUTPATIENT
Start: 2024-11-08 | End: 2024-11-08 | Stop reason: HOSPADM

## 2024-11-08 RX ORDER — SODIUM CHLORIDE 9 MG/ML
INJECTION, SOLUTION INTRAVENOUS CONTINUOUS
Status: DISPENSED | OUTPATIENT
Start: 2024-11-08 | End: 2024-11-08

## 2024-11-08 RX ORDER — SODIUM CHLORIDE 0.9 % (FLUSH) 0.9 %
5-40 SYRINGE (ML) INJECTION EVERY 12 HOURS SCHEDULED
Status: DISCONTINUED | OUTPATIENT
Start: 2024-11-08 | End: 2024-11-08 | Stop reason: HOSPADM

## 2024-11-08 RX ORDER — HEPARIN SODIUM 1000 [USP'U]/ML
INJECTION, SOLUTION INTRAVENOUS; SUBCUTANEOUS PRN
Status: DISCONTINUED | OUTPATIENT
Start: 2024-11-08 | End: 2024-11-08 | Stop reason: HOSPADM

## 2024-11-08 RX ORDER — METFORMIN HYDROCHLORIDE 750 MG/1
TABLET, EXTENDED RELEASE ORAL
Qty: 180 TABLET | Refills: 3 | Status: SHIPPED | OUTPATIENT
Start: 2024-11-10

## 2024-11-08 RX ORDER — SODIUM CHLORIDE 9 MG/ML
INJECTION, SOLUTION INTRAVENOUS PRN
Status: DISCONTINUED | OUTPATIENT
Start: 2024-11-08 | End: 2024-11-08 | Stop reason: HOSPADM

## 2024-11-08 RX ORDER — SODIUM CHLORIDE 0.9 % (FLUSH) 0.9 %
5-40 SYRINGE (ML) INJECTION PRN
Status: DISCONTINUED | OUTPATIENT
Start: 2024-11-08 | End: 2024-11-08 | Stop reason: HOSPADM

## 2024-11-08 RX ORDER — HEPARIN SODIUM 200 [USP'U]/100ML
INJECTION, SOLUTION INTRAVENOUS CONTINUOUS PRN
Status: COMPLETED | OUTPATIENT
Start: 2024-11-08 | End: 2024-11-08

## 2024-11-08 RX ORDER — VERAPAMIL HYDROCHLORIDE 2.5 MG/ML
INJECTION, SOLUTION INTRAVENOUS PRN
Status: DISCONTINUED | OUTPATIENT
Start: 2024-11-08 | End: 2024-11-08 | Stop reason: HOSPADM

## 2024-11-08 RX ORDER — MIDAZOLAM HYDROCHLORIDE 1 MG/ML
INJECTION, SOLUTION INTRAMUSCULAR; INTRAVENOUS PRN
Status: DISCONTINUED | OUTPATIENT
Start: 2024-11-08 | End: 2024-11-08 | Stop reason: HOSPADM

## 2024-11-08 RX ORDER — SODIUM CHLORIDE 9 MG/ML
INJECTION, SOLUTION INTRAVENOUS CONTINUOUS
Status: DISCONTINUED | OUTPATIENT
Start: 2024-11-08 | End: 2024-11-08 | Stop reason: HOSPADM

## 2024-11-08 ASSESSMENT — PAIN - FUNCTIONAL ASSESSMENT
PAIN_FUNCTIONAL_ASSESSMENT: 0-10

## 2024-11-08 NOTE — DISCHARGE INSTRUCTIONS
[facility]  Cardiac Catheterization Department  [address] [place], [place] [id number] [phone]        Coronary Angiogram: What to Expect at Home  Your Recovery  A coronary angiogram is a test to examine the large blood vessels of your heart (coronary arteries).  The doctor inserted a thin, flexible tube (catheter into a blood vessel in your groin or wrist.  Your groin or wrist may have a bruise and feel sore for a few days after the procedure.  You can do light activities around the house.  But do not do anything strenuous until your doctor says it is ok.  This may be for several days.  This care sheet gives you a general idea about how long it will take for you to recover.  But each person recovers at a different pace.  Follow the steps below to feel better as quickly as possible.    How can you care for yourself at home?    Activity  If the doctor gave you a sedative:  For 24 hours, don't do anything that requires attention to detail, such as going to work, making important decisions, or signing any legal documents.  It takes time for the medicine's effects to completely wear off.  For your safety, do not drive or operate any machinery that could be dangerous.  Wait until the medicine wears off and you can think clearly and react easily.  Do not do any strenuous exercise and do not lift, pull, or push anything heavier than 5 pounds (approximately the weight of 1 gallon of milk) until your doctor says it is ok.  This may be for several days.  You can walk around the house and do light activity, such as cooking.  If the catheter was placed in your groin and you must use stairs, just go up and down slowly in as few trips as possible for the first couple of days.  If the catheter was placed in your wrist, do not bend your wrist deeply for the first couple of days.  A soft wrist-splint may be placed on your wrist as a reminder following the procedure.  Be careful using your hand to get  squeezing, or burning.  Sweating.  Shortness of breath or difficulty breathing.  Nausea or vomiting.  Jaw pain, shoulder pain, or arm pain in one or both sides.  Feelings of fullness.  Excessive fatigue or weakness.  New onset anxiety.  New onset of upper back pain.  Dizziness or lightheadedness.  A fast or uneven pulse.  Call 911 if you have been diagnosed with angina, and you have symptoms that do not go away with rest or are not getting better within 5 minutes of taking a dose of your nitroglycerin.  After you call 911, the  may tell you to chew 1 adult-strength (325 mg) of 2 to 4 low-dose aspirin (81 mg).  Wait for ambulance.  Do not drive yourself.    Call your doctor now or seek immediate medical care if:  You are bleeding from the area where the catheter was inserted.  You have a fast-growing, painful lump at the catheter site.  You have signs of infection, such as”  Increased pain, swelling, warmth or redness at the catheter site.  Red streaks leading from the catheter site.  Pus draining from the catheter site.  A fever.  Your leg or hand is painful, looks blue, or feels cold, numb, or tingly.     Watch closely for changes in your health and be sure to contact your doctor if you have any problems.

## 2024-11-08 NOTE — PROGRESS NOTES
Discharge instructions printed and provided to patient with all questions answered in full. PIV x1 removed with cath tip intact. Pt VSS and no signs of distress noted. Assessed patient's right radial site. No bleeding, redness, swelling or drainage noted. Pulses +2 bilaterally. No discharge order noted. Called Dr. Reza, stated pt is okay to be discharged home at this time. Pt tolerating liquids and solids with no issues. Pt ambulating within room with no issues. Pt wheeled down to main entrance accompanied by staff. Pt condition stable at time of discharge.

## 2024-11-13 NOTE — PROGRESS NOTES
Post Cardiac Cath Follow Up Phone Call    Are you having pain today?    No    Have you had any bruising/bleeding/oozing/swelling at the cath site?    No     Are you having any numbness or tingling in the fingers or hands?    No       Reinforced compliance with prescribed medications, discharge instructions, and follow up appointment. Patient verbalized understanding.      Do you have any additional questions or concerns?   None at this time

## 2024-12-09 ENCOUNTER — TELEPHONE (OUTPATIENT)
Age: 59
End: 2024-12-09

## 2024-12-09 NOTE — TELEPHONE ENCOUNTER
Pt was called and he stated that he has been nauseated, vomiting, and have loss of appetite since he started the Ozempic. He stated he was told by his cardiologist to check with Dr. Lomeli about decreasing the dose of the Ozempic.

## 2024-12-09 NOTE — TELEPHONE ENCOUNTER
Patient states he is concerned the ozempic is causing vomiting. He would like to discuss and maybe reduce dosage

## 2024-12-10 ENCOUNTER — TRANSCRIBE ORDERS (OUTPATIENT)
Facility: HOSPITAL | Age: 59
End: 2024-12-10

## 2024-12-10 DIAGNOSIS — I50.9 HEART FAILURE, UNSPECIFIED HF CHRONICITY, UNSPECIFIED HEART FAILURE TYPE (HCC): ICD-10-CM

## 2024-12-10 DIAGNOSIS — I42.9 CARDIOMYOPATHY, UNSPECIFIED TYPE (HCC): Primary | ICD-10-CM

## 2024-12-10 DIAGNOSIS — I50.9 HEART FAILURE, UNSPECIFIED HF CHRONICITY, UNSPECIFIED HEART FAILURE TYPE (HCC): Primary | ICD-10-CM

## 2024-12-10 DIAGNOSIS — I42.9 CARDIOMYOPATHY, UNSPECIFIED TYPE (HCC): ICD-10-CM

## 2025-01-13 DIAGNOSIS — T78.40XA ALLERGY, INITIAL ENCOUNTER: ICD-10-CM

## 2025-01-13 RX ORDER — ALBUTEROL SULFATE 90 UG/1
INHALANT RESPIRATORY (INHALATION)
Qty: 18 G | Refills: 1 | Status: SHIPPED | OUTPATIENT
Start: 2025-01-13

## 2025-01-14 ENCOUNTER — HOSPITAL ENCOUNTER (OUTPATIENT)
Facility: HOSPITAL | Age: 60
Discharge: HOME | End: 2025-01-17
Payer: COMMERCIAL

## 2025-01-14 DIAGNOSIS — I42.9 CARDIOMYOPATHY, UNSPECIFIED TYPE (HCC): ICD-10-CM

## 2025-01-14 DIAGNOSIS — I50.9 HEART FAILURE, UNSPECIFIED HF CHRONICITY, UNSPECIFIED HEART FAILURE TYPE (HCC): ICD-10-CM

## 2025-01-14 LAB
LEFT VENTRICULAR EJECTION FRACTION MODE: NORMAL
LV EF: 40 %

## 2025-01-14 PROCEDURE — 6360000004 HC RX CONTRAST MEDICATION: Performed by: NURSE PRACTITIONER

## 2025-01-14 PROCEDURE — 75561 CARDIAC MRI FOR MORPH W/DYE: CPT

## 2025-01-14 PROCEDURE — A9579 GAD-BASE MR CONTRAST NOS,1ML: HCPCS | Performed by: NURSE PRACTITIONER

## 2025-01-14 PROCEDURE — 75561 CARDIAC MRI FOR MORPH W/DYE: CPT | Performed by: INTERNAL MEDICINE

## 2025-01-14 RX ADMIN — GADOTERIDOL 39 ML: 279.3 INJECTION, SOLUTION INTRAVENOUS at 09:19

## 2025-01-22 ENCOUNTER — OFFICE VISIT (OUTPATIENT)
Dept: PRIMARY CARE CLINIC | Facility: CLINIC | Age: 60
End: 2025-01-22
Payer: COMMERCIAL

## 2025-01-22 VITALS
OXYGEN SATURATION: 92 % | TEMPERATURE: 97.9 F | SYSTOLIC BLOOD PRESSURE: 131 MMHG | BODY MASS INDEX: 35.07 KG/M2 | DIASTOLIC BLOOD PRESSURE: 78 MMHG | RESPIRATION RATE: 16 BRPM | HEART RATE: 83 BPM | WEIGHT: 288 LBS | HEIGHT: 76 IN

## 2025-01-22 DIAGNOSIS — I50.9 CHRONIC CONGESTIVE HEART FAILURE, UNSPECIFIED HEART FAILURE TYPE (HCC): ICD-10-CM

## 2025-01-22 DIAGNOSIS — R05.3 CHRONIC COUGH: ICD-10-CM

## 2025-01-22 DIAGNOSIS — E11.65 TYPE 2 DIABETES MELLITUS WITH HYPERGLYCEMIA, UNSPECIFIED WHETHER LONG TERM INSULIN USE (HCC): ICD-10-CM

## 2025-01-22 DIAGNOSIS — J84.10 PULMONARY FIBROSIS (HCC): Primary | ICD-10-CM

## 2025-01-22 DIAGNOSIS — K21.9 GASTRO-ESOPHAGEAL REFLUX DISEASE WITHOUT ESOPHAGITIS: Chronic | ICD-10-CM

## 2025-01-22 PROCEDURE — 99214 OFFICE O/P EST MOD 30 MIN: CPT | Performed by: NURSE PRACTITIONER

## 2025-01-22 PROCEDURE — 3075F SYST BP GE 130 - 139MM HG: CPT | Performed by: NURSE PRACTITIONER

## 2025-01-22 PROCEDURE — 3078F DIAST BP <80 MM HG: CPT | Performed by: NURSE PRACTITIONER

## 2025-01-22 RX ORDER — BENZONATATE 200 MG/1
200 CAPSULE ORAL 3 TIMES DAILY PRN
Qty: 30 CAPSULE | Refills: 0 | Status: SHIPPED | OUTPATIENT
Start: 2025-01-22 | End: 2025-02-01

## 2025-01-22 RX ORDER — DULAGLUTIDE 1.5 MG/.5ML
INJECTION, SOLUTION SUBCUTANEOUS
COMMUNITY
End: 2025-01-22

## 2025-01-22 RX ORDER — OMEPRAZOLE 40 MG/1
40 CAPSULE, DELAYED RELEASE ORAL DAILY
Qty: 90 CAPSULE | Refills: 1 | Status: SHIPPED | OUTPATIENT
Start: 2025-01-22

## 2025-01-22 RX ORDER — ONDANSETRON 4 MG/1
4 TABLET, ORALLY DISINTEGRATING ORAL
COMMUNITY
Start: 2024-11-27

## 2025-01-22 SDOH — ECONOMIC STABILITY: FOOD INSECURITY: WITHIN THE PAST 12 MONTHS, YOU WORRIED THAT YOUR FOOD WOULD RUN OUT BEFORE YOU GOT MONEY TO BUY MORE.: NEVER TRUE

## 2025-01-22 SDOH — ECONOMIC STABILITY: FOOD INSECURITY: WITHIN THE PAST 12 MONTHS, THE FOOD YOU BOUGHT JUST DIDN'T LAST AND YOU DIDN'T HAVE MONEY TO GET MORE.: NEVER TRUE

## 2025-01-22 ASSESSMENT — PATIENT HEALTH QUESTIONNAIRE - PHQ9
1. LITTLE INTEREST OR PLEASURE IN DOING THINGS: NOT AT ALL
SUM OF ALL RESPONSES TO PHQ QUESTIONS 1-9: 0
2. FEELING DOWN, DEPRESSED OR HOPELESS: NOT AT ALL
SUM OF ALL RESPONSES TO PHQ QUESTIONS 1-9: 0
SUM OF ALL RESPONSES TO PHQ9 QUESTIONS 1 & 2: 0

## 2025-01-22 ASSESSMENT — ENCOUNTER SYMPTOMS
CHEST TIGHTNESS: 0
SHORTNESS OF BREATH: 1
COUGH: 1
WHEEZING: 0

## 2025-01-22 NOTE — PROGRESS NOTES
Chief Complaint   Patient presents with    Cough    Congestion     Chest congestion      /78 (Site: Left Upper Arm, Position: Sitting)   Pulse 83   Temp 97.9 °F (36.6 °C) (Oral)   Resp 16   Ht 1.93 m (6' 4\")   Wt 130.6 kg (288 lb)   SpO2 92%   BMI 35.06 kg/m²     \"Have you been to the ER, urgent care clinic since your last visit?  Hospitalized since your last visit?\"    NO    “Have you seen or consulted any other health care providers outside our system since your last visit?”    NO      “Have you had a colorectal cancer screening such as a colonoscopy/FIT/Cologuard?    NO    Date of last Colonoscopy: 1/13/2017  No cologuard on file  No FIT/FOBT on file   No flexible sigmoidoscopy on file            
Sitting   Pulse: 83   Resp: 16   Temp: 97.9 °F (36.6 °C)   TempSrc: Oral   SpO2: 92%   Weight: 130.6 kg (288 lb)   Height: 1.93 m (6' 4\")      Body mass index is 35.06 kg/m².     Physical Exam  Constitutional:       Appearance: Normal appearance. He is obese.      Comments: Using a cane   HENT:      Head: Normocephalic.   Eyes:      Conjunctiva/sclera: Conjunctivae normal.   Cardiovascular:      Rate and Rhythm: Normal rate and regular rhythm.      Pulses: Normal pulses.      Heart sounds: Normal heart sounds.   Pulmonary:      Effort: Pulmonary effort is normal.      Breath sounds: Normal breath sounds.   Musculoskeletal:      Cervical back: Normal range of motion.   Skin:     General: Skin is warm and dry.   Neurological:      Mental Status: He is alert and oriented to person, place, and time.   Psychiatric:         Mood and Affect: Mood normal.         Behavior: Behavior normal.          Allergies   Allergen Reactions    Penicillins Hives       Current Outpatient Medications   Medication Sig Dispense Refill    ondansetron (ZOFRAN-ODT) 4 MG disintegrating tablet 1 tablet      Semaglutide,0.25 or 0.5MG/DOS, 2 MG/3ML SOPN Inject 0.5 mg into the skin every 7 days 9 mL 0    benzonatate (TESSALON) 200 MG capsule Take 1 capsule by mouth 3 times daily as needed for Cough 30 capsule 0    omeprazole (PRILOSEC) 40 MG delayed release capsule Take 1 capsule by mouth daily 90 capsule 1    albuterol sulfate HFA (PROVENTIL;VENTOLIN;PROAIR) 108 (90 Base) MCG/ACT inhaler INHALE 1 PUFF BY MOUTH INTO THE LUNGS EVERY 6 HOURS AS NEEDED FOR WHEEZING 18 g 1    metFORMIN (GLUCOPHAGE-XR) 750 MG extended release tablet One pill twice  a  day   STOP  janumet  xr 180 tablet 3    CYCLOSET 0.8 MG TABS 5 TABLETS BY MOUTH WITHIN 2 HOURS OF WAKING UP IN THE MORNING 450 tablet 1    glipiZIDE (GLUCOTROL) 10 MG tablet TAKE 1 TABLET BY MOUTH 20 MINUTES BEFORE BREAKFAST AND 20 MINUTES BEFORE DINNER 180 tablet 3    furosemide (LASIX) 40 MG tablet Take

## 2025-02-11 DIAGNOSIS — E11.65 TYPE 2 DIABETES MELLITUS WITH HYPERGLYCEMIA, UNSPECIFIED WHETHER LONG TERM INSULIN USE (HCC): ICD-10-CM

## 2025-02-11 DIAGNOSIS — E78.2 MIXED HYPERLIPIDEMIA: ICD-10-CM

## 2025-02-11 DIAGNOSIS — I10 ESSENTIAL (PRIMARY) HYPERTENSION: ICD-10-CM

## 2025-02-12 RX ORDER — HYDROCHLOROTHIAZIDE 12.5 MG/1
CAPSULE ORAL
Qty: 6 EACH | Refills: 3 | Status: SHIPPED | OUTPATIENT
Start: 2025-02-12

## 2025-02-14 ENCOUNTER — TELEPHONE (OUTPATIENT)
Age: 60
End: 2025-02-14

## 2025-02-14 NOTE — TELEPHONE ENCOUNTER
Pt is concerned that he is vomiting food from several days ago from taking the Ozempic. He does not understand why it is doing that to him. He cut back from 72 clicks to 35 clicks on the pen but that did not seem to help. He said he will not take his dose today but would like to know what to do going forward

## 2025-02-14 NOTE — TELEPHONE ENCOUNTER
Hi,    Pt says, his Ozempic is causing him to vomit after meals and feel sick. Pt requests to speak w/a nurse soon, please.     Thank you.

## 2025-02-17 ENCOUNTER — TELEPHONE (OUTPATIENT)
Dept: PRIMARY CARE CLINIC | Facility: CLINIC | Age: 60
End: 2025-02-17

## 2025-02-17 RX ORDER — BENZONATATE 100 MG/1
100 CAPSULE ORAL 3 TIMES DAILY PRN
Qty: 30 CAPSULE | OUTPATIENT
Start: 2025-02-17

## 2025-02-17 RX ORDER — BENZONATATE 200 MG/1
200 CAPSULE ORAL 3 TIMES DAILY PRN
Qty: 30 CAPSULE | Refills: 0 | Status: SHIPPED | OUTPATIENT
Start: 2025-02-17 | End: 2025-02-27

## 2025-02-17 NOTE — TELEPHONE ENCOUNTER
Informed pt of Dr. Sequeira's note. Pt verbalized understanding with no further questions or concerns at this time.Transferred to the  to schedule labs this week

## 2025-02-17 NOTE — TELEPHONE ENCOUNTER
Hold ozempic.   Lets go ahead and check a lipase level (please order lipase)   Try to avoid fatty/fried foods in meantime as may make this worse

## 2025-02-18 ENCOUNTER — LAB (OUTPATIENT)
Age: 60
End: 2025-02-18

## 2025-02-18 DIAGNOSIS — E78.2 MIXED HYPERLIPIDEMIA: ICD-10-CM

## 2025-02-18 DIAGNOSIS — E11.65 TYPE 2 DIABETES MELLITUS WITH HYPERGLYCEMIA, UNSPECIFIED WHETHER LONG TERM INSULIN USE (HCC): ICD-10-CM

## 2025-02-18 DIAGNOSIS — I10 ESSENTIAL (PRIMARY) HYPERTENSION: ICD-10-CM

## 2025-02-19 LAB
ALBUMIN SERPL-MCNC: 3.4 G/DL (ref 3.5–5)
ALBUMIN/GLOB SERPL: 1 (ref 1.1–2.2)
ALP SERPL-CCNC: 96 U/L (ref 45–117)
ALT SERPL-CCNC: 20 U/L (ref 12–78)
ANION GAP SERPL CALC-SCNC: 10 MMOL/L (ref 2–12)
AST SERPL-CCNC: 26 U/L (ref 15–37)
BILIRUB SERPL-MCNC: 1.2 MG/DL (ref 0.2–1)
BUN SERPL-MCNC: 11 MG/DL (ref 6–20)
BUN/CREAT SERPL: 12 (ref 12–20)
CALCIUM SERPL-MCNC: 7.6 MG/DL (ref 8.5–10.1)
CHLORIDE SERPL-SCNC: 104 MMOL/L (ref 97–108)
CHOLEST SERPL-MCNC: 105 MG/DL
CO2 SERPL-SCNC: 29 MMOL/L (ref 21–32)
CREAT SERPL-MCNC: 0.89 MG/DL (ref 0.7–1.3)
CREAT UR-MCNC: 336 MG/DL
EST. AVERAGE GLUCOSE BLD GHB EST-MCNC: 220 MG/DL
GLOBULIN SER CALC-MCNC: 3.3 G/DL (ref 2–4)
GLUCOSE SERPL-MCNC: 148 MG/DL (ref 65–100)
HBA1C MFR BLD: 9.3 % (ref 4–5.6)
HDLC SERPL-MCNC: 27 MG/DL
HDLC SERPL: 3.9 (ref 0–5)
LDLC SERPL CALC-MCNC: 58.4 MG/DL (ref 0–100)
MICROALBUMIN UR-MCNC: 178 MG/DL
MICROALBUMIN/CREAT UR-RTO: 530 MG/G (ref 0–30)
POTASSIUM SERPL-SCNC: 2.8 MMOL/L (ref 3.5–5.1)
PROT SERPL-MCNC: 6.7 G/DL (ref 6.4–8.2)
SODIUM SERPL-SCNC: 143 MMOL/L (ref 136–145)
TRIGL SERPL-MCNC: 98 MG/DL
VLDLC SERPL CALC-MCNC: 19.6 MG/DL

## 2025-02-19 NOTE — RESULT ENCOUNTER NOTE
Potassium is low, sometimes medications like furosemide/Lasix can lower the potassium.  Spinach, tomatoes, bananas are high in potassium, add this to your diet    If you have a lot of leg cramps, weakness please consult the doctor who has prescribed furosemide and see if you need potassium supplements

## 2025-02-20 ENCOUNTER — TELEPHONE (OUTPATIENT)
Age: 60
End: 2025-02-20

## 2025-02-20 NOTE — TELEPHONE ENCOUNTER
Informed pt of Dr. Montgomery's note. Pt verbalized understanding with no further questions or concerns at this time.

## 2025-02-20 NOTE — TELEPHONE ENCOUNTER
----- Message from Dr. Mary Lou Montgomery MD sent at 2/19/2025  5:35 PM EST -----  Potassium is low, sometimes medications like furosemide/Lasix can lower the potassium.  Spinach, tomatoes, bananas are high in potassium, add this to your diet    If you have a lot of leg cramps, weakness please consult the doctor who has prescribed furosemide and see if you need potassium supplements

## 2025-02-20 NOTE — TELEPHONE ENCOUNTER
Attempted to call. Unsuccessful. Left msg for Deon Foote to give us a call back at the office. A callback number was left.

## 2025-02-28 ENCOUNTER — TELEPHONE (OUTPATIENT)
Age: 60
End: 2025-02-28

## 2025-02-28 NOTE — TELEPHONE ENCOUNTER
Pt states he is getting ready to have labs drawn for his pulmonologist next week at [facility]. He has not had any done since he was in our office on 02/18/2025. Pt states he has had no leg cramps or weakness.

## 2025-02-28 NOTE — TELEPHONE ENCOUNTER
----- Message from Dr. Marivel Lomeli MD sent at 2/28/2025 10:30 AM EST -----  Potassium was pretty low and Dr. Montgomery advised in my absence   Did he see pcp   to get  lab repeated

## 2025-03-04 ENCOUNTER — HOSPITAL ENCOUNTER (OUTPATIENT)
Facility: HOSPITAL | Age: 60
Discharge: HOME | End: 2025-03-07
Payer: COMMERCIAL

## 2025-03-04 ENCOUNTER — TRANSCRIBE ORDERS (OUTPATIENT)
Facility: HOSPITAL | Age: 60
End: 2025-03-04

## 2025-03-04 DIAGNOSIS — J84.10 PULMONARY FIBROSIS (HCC): Primary | ICD-10-CM

## 2025-03-04 DIAGNOSIS — J84.10 PULMONARY FIBROSIS (HCC): ICD-10-CM

## 2025-03-04 LAB
CK SERPL-CCNC: 318 U/L (ref 39–308)
ERYTHROCYTE [SEDIMENTATION RATE] IN BLOOD: 23 MM/HR (ref 0–20)

## 2025-03-04 PROCEDURE — 86200 CCP ANTIBODY: CPT

## 2025-03-04 PROCEDURE — 86431 RHEUMATOID FACTOR QUANT: CPT

## 2025-03-04 PROCEDURE — 36415 COLL VENOUS BLD VENIPUNCTURE: CPT

## 2025-03-04 PROCEDURE — 86235 NUCLEAR ANTIGEN ANTIBODY: CPT

## 2025-03-04 PROCEDURE — 82550 ASSAY OF CK (CPK): CPT

## 2025-03-04 PROCEDURE — 83520 IMMUNOASSAY QUANT NOS NONAB: CPT

## 2025-03-04 PROCEDURE — 85652 RBC SED RATE AUTOMATED: CPT

## 2025-03-06 LAB — ENA JO1 AB SER-ACNC: <0.2 AI (ref 0–0.9)

## 2025-03-07 ENCOUNTER — TELEPHONE (OUTPATIENT)
Age: 60
End: 2025-03-07

## 2025-03-07 NOTE — TELEPHONE ENCOUNTER
Hi,    Pt has a lab appt on 3/20 @845AM and wants to know should he fast? I did not see any labs in pt's chart. Please call pt to advised.     Thank you.

## 2025-03-10 DIAGNOSIS — I10 ESSENTIAL (PRIMARY) HYPERTENSION: ICD-10-CM

## 2025-03-10 DIAGNOSIS — E11.65 TYPE 2 DIABETES MELLITUS WITH HYPERGLYCEMIA, UNSPECIFIED WHETHER LONG TERM INSULIN USE (HCC): Primary | ICD-10-CM

## 2025-03-10 DIAGNOSIS — E78.2 MIXED HYPERLIPIDEMIA: ICD-10-CM

## 2025-03-10 NOTE — TELEPHONE ENCOUNTER
Pt had DM4 done 2/18/25 do you still need lipid and micro albumin urine and did you want cmp as well?

## 2025-03-12 LAB
14-3-3 ETA AG SER IA-MCNC: <0.2 NG/ML
CCP IGA+IGG SERPL IA-ACNC: <20 UNITS
RHEUMATOID FACT SERPL-ACNC: <14 UNITS/ML

## 2025-03-27 ENCOUNTER — OFFICE VISIT (OUTPATIENT)
Age: 60
End: 2025-03-27
Payer: COMMERCIAL

## 2025-03-27 VITALS
SYSTOLIC BLOOD PRESSURE: 109 MMHG | OXYGEN SATURATION: 88 % | HEART RATE: 79 BPM | WEIGHT: 280 LBS | BODY MASS INDEX: 34.1 KG/M2 | RESPIRATION RATE: 16 BRPM | TEMPERATURE: 97.3 F | DIASTOLIC BLOOD PRESSURE: 69 MMHG | HEIGHT: 76 IN

## 2025-03-27 DIAGNOSIS — E78.2 MIXED HYPERLIPIDEMIA: ICD-10-CM

## 2025-03-27 DIAGNOSIS — E11.65 TYPE 2 DIABETES MELLITUS WITH HYPERGLYCEMIA, UNSPECIFIED WHETHER LONG TERM INSULIN USE (HCC): Primary | ICD-10-CM

## 2025-03-27 DIAGNOSIS — I10 ESSENTIAL (PRIMARY) HYPERTENSION: ICD-10-CM

## 2025-03-27 PROBLEM — N18.32 CHRONIC KIDNEY DISEASE, STAGE 3B (HCC): Status: ACTIVE | Noted: 2025-03-27

## 2025-03-27 PROCEDURE — 3046F HEMOGLOBIN A1C LEVEL >9.0%: CPT | Performed by: INTERNAL MEDICINE

## 2025-03-27 PROCEDURE — 3078F DIAST BP <80 MM HG: CPT | Performed by: INTERNAL MEDICINE

## 2025-03-27 PROCEDURE — 3074F SYST BP LT 130 MM HG: CPT | Performed by: INTERNAL MEDICINE

## 2025-03-27 PROCEDURE — 99215 OFFICE O/P EST HI 40 MIN: CPT | Performed by: INTERNAL MEDICINE

## 2025-03-27 PROCEDURE — 95251 CONT GLUC MNTR ANALYSIS I&R: CPT | Performed by: INTERNAL MEDICINE

## 2025-03-27 RX ORDER — INSULIN GLARGINE 100 [IU]/ML
30 INJECTION, SOLUTION SUBCUTANEOUS NIGHTLY
Qty: 15 ML | Refills: 4 | Status: SHIPPED | OUTPATIENT
Start: 2025-03-27

## 2025-03-27 RX ORDER — DAPAGLIFLOZIN 10 MG/1
10 TABLET, FILM COATED ORAL EVERY MORNING
Qty: 90 TABLET | Refills: 1 | Status: SHIPPED | OUTPATIENT
Start: 2025-03-27

## 2025-03-27 RX ORDER — NATEGLINIDE 120 MG/1
120 TABLET ORAL
Qty: 90 TABLET | Refills: 3 | Status: SHIPPED | OUTPATIENT
Start: 2025-03-27 | End: 2026-03-27

## 2025-03-27 RX ORDER — PEN NEEDLE, DIABETIC 30 GX3/16"
NEEDLE, DISPOSABLE MISCELLANEOUS
Qty: 100 EACH | Refills: 3 | Status: SHIPPED | OUTPATIENT
Start: 2025-03-27

## 2025-03-27 NOTE — PATIENT INSTRUCTIONS
SPECIFIC INSTRUCTIONS BELOW         DRINK water   Do not skip meals  Do not eat in between meals    Reduce carbs- pasta, rice, potatoes, bread   Try to avoid processed bread products like BISCUITS, CROISSANTS, MUFFINS    Do not drink juices or sodas, even if they are calorie zero or diet drinks and especially avoid using powders like crystalloids , MAGALY-AIDS     Do not eat peanut butter     Do not eat sugar free cookies and cakes   Do not eat peaches, oranges, pineapples, raisins, grapes , canteloupe , honey dew, mangoes , watermelon  and fruit medleys    ----------------------------------------------------    Smoothie  on occasion   Lunch       Stop glipizide  ;  cycloset   ; ozempic       Start on  lantus   30  units at bed time   Start on starlix 120 mg right before each meal  (  stop glipizide )     Do not take it if you are not eating        Metformin  sr  750 mg twice a day with meals      Farxiga  10   Mg , right before   b-fast    ( drink plenty of water )             -------------PAY ATTENTION TO THESE GENERAL INSTRUCTIONS -----------------      - The medications prescribed at this visit will not be available at pharmacy until 6 pm today        - your med list is not updated until the visit encounter is closed, so FOLLOW THE TYPED SPECIFIC INSTRUCTIONS  ABOVE     -ANY tests other than blood work, which you opt to do  outside the  [facility] facilities, you are responsible for prior authorizations if  required    - health maintenance will not be updated  on AVS, so please ignore it       Results     *Normal results will not be notified by a phone call starting January 1 2024   *If you have an upcoming visit, the results will be discussed at the visit   *Please sign up for MY CHART if you want access to your lab and test results  *Abnormal results which require immediate attention will be notified by phone call   *Abnormal results which do not require immediate assistance will be notified in

## 2025-03-27 NOTE — PROGRESS NOTES
Deon Foote is a 59 y.o. male here for No chief complaint on file.      1. Have you been to the ER, urgent care clinic since your last visit?  Hospitalized since your last visit? - ER visit 11/08/24- Cardiomyopathy- pt states Ozempic made him sick, GI issues, food wasn't digesting.    2. Have you seen or consulted any other health care providers [facility] since your last visit?  Include any pap smears or colon screening.- Pulmonologist

## 2025-03-27 NOTE — PROGRESS NOTES
[facility] DIABETES AND ENDOCRINOLOGY              Marivel Lomeli MD FACE           HISTORY OF PRESENT ILLNESS   Deon Foote is a 59  y.o.  male.   Patient here for   f/u after last  visit of Type 2 diabetes mellitus   From   October 2024    He is now found to have CHF      and  also pulmonary fibrosis   Saw pulmonary     He is c/o lack of appetite despite  stopping ozempic   Says  eating only one meal    Smoothie for b-fast on occasion   He is only eating   ice chips  and water       October 2024      Lost 2 lbs   Using markie   Going for cardiac cath  in first week of  Nov 2024 June 2024     On markie 3   Lost 20 lbs   Doing better         Feb 2024   A gap of 14 months   He has no clear reason not to follow up   He reports that he is taking  all the meds but for trulicity   He likes to use markie 3        Jan 2022       A long gap of 1.5 years almost    Lost many of relatives to COVID          Old history :   Referred : by self/pcp   H/o diabetes for many years    Current A1C is 11.6 %  From feb 2018  and symptoms/problems include polyuria, polydipsia and visual disturbances    Current diabetic medications include intensive insulin injection program.    Current monitoring regimen: home blood tests - 2 times daily   Home blood sugar records: trend: fluctuating a lot   Any episodes of hypoglycemia? yes - multiple         Review of Systems    Psychiatric/Behavioral: Negative for depression and memory loss. The patient does not have insomnia.           Physical Exam    Constitutional: He is oriented to person, place, and time. He appears well-developed and well-nourished.    EDENTULOUS    Psychiatric: He has a normal mood and affect.         Labs      Lab Results   Component Value Date    LABA1C 9.3 (H) 02/18/2025    LABA1C 7.6 (H) 10/21/2024    LABA1C 8.8 (H) 05/30/2024       Lab Results   Component Value Date     02/18/2025    K 2.8 (L) 02/18/2025     02/18/2025    CO2 29 02/18/2025

## 2025-04-24 DIAGNOSIS — T78.40XA ALLERGY, INITIAL ENCOUNTER: ICD-10-CM

## 2025-04-24 RX ORDER — ALBUTEROL SULFATE 90 UG/1
INHALANT RESPIRATORY (INHALATION)
Qty: 18 G | Refills: 1 | Status: SHIPPED | OUTPATIENT
Start: 2025-04-24

## 2025-04-28 ENCOUNTER — OFFICE VISIT (OUTPATIENT)
Age: 60
End: 2025-04-28

## 2025-04-28 VITALS
DIASTOLIC BLOOD PRESSURE: 69 MMHG | WEIGHT: 299.8 LBS | OXYGEN SATURATION: 88 % | HEART RATE: 73 BPM | HEIGHT: 76 IN | BODY MASS INDEX: 36.51 KG/M2 | SYSTOLIC BLOOD PRESSURE: 110 MMHG | TEMPERATURE: 97.5 F

## 2025-04-28 DIAGNOSIS — E66.01 MORBID (SEVERE) OBESITY DUE TO EXCESS CALORIES (HCC): ICD-10-CM

## 2025-04-28 DIAGNOSIS — N18.32 CHRONIC KIDNEY DISEASE, STAGE 3B (HCC): ICD-10-CM

## 2025-04-28 DIAGNOSIS — E11.65 TYPE 2 DIABETES MELLITUS WITH HYPERGLYCEMIA, UNSPECIFIED WHETHER LONG TERM INSULIN USE (HCC): Primary | ICD-10-CM

## 2025-04-28 DIAGNOSIS — E78.2 MIXED HYPERLIPIDEMIA: ICD-10-CM

## 2025-04-28 DIAGNOSIS — I10 ESSENTIAL (PRIMARY) HYPERTENSION: ICD-10-CM

## 2025-04-28 LAB
ALBUMIN SERPL-MCNC: 3.4 G/DL (ref 3.5–5)
ALBUMIN/GLOB SERPL: 0.9 (ref 1.1–2.2)
ALP SERPL-CCNC: 172 U/L (ref 45–117)
ALT SERPL-CCNC: 34 U/L (ref 12–78)
ANION GAP SERPL CALC-SCNC: 9 MMOL/L (ref 2–12)
AST SERPL-CCNC: 35 U/L (ref 15–37)
BILIRUB SERPL-MCNC: 2 MG/DL (ref 0.2–1)
BUN SERPL-MCNC: 28 MG/DL (ref 6–20)
BUN/CREAT SERPL: 23 (ref 12–20)
CALCIUM SERPL-MCNC: 8.5 MG/DL (ref 8.5–10.1)
CHLORIDE SERPL-SCNC: 106 MMOL/L (ref 97–108)
CHOLEST SERPL-MCNC: 84 MG/DL
CO2 SERPL-SCNC: 30 MMOL/L (ref 21–32)
CREAT SERPL-MCNC: 1.23 MG/DL (ref 0.7–1.3)
EST. AVERAGE GLUCOSE BLD GHB EST-MCNC: 200 MG/DL
GLOBULIN SER CALC-MCNC: 3.6 G/DL (ref 2–4)
GLUCOSE SERPL-MCNC: 153 MG/DL (ref 65–100)
HBA1C MFR BLD: 8.6 % (ref 4–5.6)
HDLC SERPL-MCNC: 35 MG/DL
HDLC SERPL: 2.4 (ref 0–5)
LDLC SERPL CALC-MCNC: 36.8 MG/DL (ref 0–100)
POTASSIUM SERPL-SCNC: 3.9 MMOL/L (ref 3.5–5.1)
PROT SERPL-MCNC: 7 G/DL (ref 6.4–8.2)
SODIUM SERPL-SCNC: 145 MMOL/L (ref 136–145)
TRIGL SERPL-MCNC: 61 MG/DL
VLDLC SERPL CALC-MCNC: 12.2 MG/DL

## 2025-04-28 RX ORDER — METFORMIN HYDROCHLORIDE 750 MG/1
TABLET, EXTENDED RELEASE ORAL
Qty: 90 TABLET | Refills: 3 | Status: SHIPPED | OUTPATIENT
Start: 2025-04-28

## 2025-04-28 NOTE — PROGRESS NOTES
[facility] DIABETES AND ENDOCRINOLOGY              Marivel Lomeli MD FACE           HISTORY OF PRESENT ILLNESS   Deon Foote is a 59  y.o.  male.   Patient here for   f/u after last  visit of Type 2 diabetes mellitus   From   March 2025        This is one month follow up since last seen , I started him in insulin LA   and   prandin   Stopped cycloset, ozempic and glipizide     Accompanied by wife and son   GAINED  19 lbs         March 2025     He is now found to have CHF      and  also pulmonary fibrosis   Saw pulmonary   He is c/o lack of appetite despite  stopping ozempic   Says  eating only one meal    Smoothie for b-fast on occasion   He is only eating   ice chips  and water       October 2024      Lost 2 lbs   Using markie   Going for cardiac cath  in first week of  Nov 2024 June 2024     On markie 3   Lost 20 lbs   Doing better         Feb 2024   A gap of 14 months   He has no clear reason not to follow up   He reports that he is taking  all the meds but for trulicity   He likes to use markie 3        Jan 2022       A long gap of 1.5 years almost    Lost many of relatives to COVID          Old history :   Referred : by self/pcp   H/o diabetes for many years    Current A1C is 11.6 %  From feb 2018  and symptoms/problems include polyuria, polydipsia and visual disturbances    Current diabetic medications include intensive insulin injection program.    Current monitoring regimen: home blood tests - 2 times daily   Home blood sugar records: trend: fluctuating a lot   Any episodes of hypoglycemia? yes - multiple         Review of Systems    Psychiatric/Behavioral: Negative for depression and memory loss. The patient does not have insomnia.           Physical Exam    Constitutional: He is oriented to person, place, and time. He appears well-developed and well-nourished.    EDENTULOUS    Psychiatric: He has a normal mood and affect.         Labs      Lab Results   Component Value Date    LABA1C 9.3 (H)

## 2025-04-28 NOTE — PROGRESS NOTES
Deon Foote is a 59 y.o. male here for   Chief Complaint   Patient presents with    Diabetes       1. Have you been to the ER, urgent care clinic since your last visit?  Hospitalized since your last visit? -No    2. Have you seen or consulted any other health care providers outside of the [facility] since your last visit?  Include any pap smears or colon screening.-[facility]

## 2025-04-28 NOTE — PATIENT INSTRUCTIONS
SPECIFIC INSTRUCTIONS BELOW         DRINK water   Do not skip meals  Do not eat in between meals    Reduce carbs- pasta, rice, potatoes, bread   Try to avoid processed bread products like BISCUITS, CROISSANTS, MUFFINS    Do not drink juices or sodas, even if they are calorie zero or diet drinks and especially avoid using powders like crystalloids , MAGALY-AIDS     Do not eat peanut butter     Do not eat sugar free cookies and cakes   Do not eat peaches, oranges, pineapples, raisins, grapes , canteloupe , honey dew, mangoes , watermelon  and fruit medleys    ----------------------------------------------------  lantus   30  units at bed time     starlix 120 mg right before each meal   Do not take it if you are not eating        Metformin  sr  750 mg once a day with meals      Farxiga  10   Mg , right before   b-fast    ( drink plenty of water )             -------------PAY ATTENTION TO THESE GENERAL INSTRUCTIONS -----------------      - The medications prescribed at this visit will not be available at pharmacy until 6 pm today        - your med list is not updated until the visit encounter is closed, so FOLLOW THE TYPED SPECIFIC INSTRUCTIONS  ABOVE     -ANY tests other than blood work, which you opt to do  outside the  [facility] facilities, you are responsible for prior authorizations if  required    - health maintenance will not be updated  on AVS, so please ignore it       Results     *Normal results will not be notified by a phone call starting January 1 2024   *If you have an upcoming visit, the results will be discussed at the visit   *Please sign up for MY CHART if you want access to your lab and test results  *Abnormal results which require immediate attention will be notified by phone call   *Abnormal results which do not require immediate assistance will be notified in 1-2 weeks       Refills    -    have your pharmacy send us a refill request . Refills are done max for one year and a visit is

## 2025-05-12 DIAGNOSIS — E78.2 MIXED HYPERLIPIDEMIA: ICD-10-CM

## 2025-05-12 RX ORDER — SIMVASTATIN 20 MG
20 TABLET ORAL NIGHTLY
Qty: 90 TABLET | Refills: 1 | Status: SHIPPED | OUTPATIENT
Start: 2025-05-12

## 2025-05-21 DIAGNOSIS — K21.9 GASTRO-ESOPHAGEAL REFLUX DISEASE WITHOUT ESOPHAGITIS: Chronic | ICD-10-CM

## 2025-05-21 RX ORDER — OMEPRAZOLE 40 MG/1
40 CAPSULE, DELAYED RELEASE ORAL DAILY
Qty: 90 CAPSULE | Refills: 1 | Status: SHIPPED | OUTPATIENT
Start: 2025-05-21

## 2025-07-16 DIAGNOSIS — I10 ESSENTIAL (PRIMARY) HYPERTENSION: ICD-10-CM

## 2025-07-16 DIAGNOSIS — E78.2 MIXED HYPERLIPIDEMIA: ICD-10-CM

## 2025-07-16 DIAGNOSIS — E11.65 TYPE 2 DIABETES MELLITUS WITH HYPERGLYCEMIA, UNSPECIFIED WHETHER LONG TERM INSULIN USE (HCC): ICD-10-CM

## 2025-07-17 RX ORDER — NATEGLINIDE 120 MG/1
TABLET ORAL
Qty: 270 TABLET | Refills: 3 | Status: SHIPPED | OUTPATIENT
Start: 2025-07-17

## 2025-07-22 ENCOUNTER — OFFICE VISIT (OUTPATIENT)
Dept: PRIMARY CARE CLINIC | Facility: CLINIC | Age: 60
End: 2025-07-22
Payer: COMMERCIAL

## 2025-07-22 VITALS
OXYGEN SATURATION: 82 % | BODY MASS INDEX: 38.54 KG/M2 | SYSTOLIC BLOOD PRESSURE: 124 MMHG | HEIGHT: 75 IN | RESPIRATION RATE: 16 BRPM | HEART RATE: 70 BPM | WEIGHT: 310 LBS | TEMPERATURE: 97.7 F | DIASTOLIC BLOOD PRESSURE: 84 MMHG

## 2025-07-22 DIAGNOSIS — K21.9 GASTRO-ESOPHAGEAL REFLUX DISEASE WITHOUT ESOPHAGITIS: Chronic | ICD-10-CM

## 2025-07-22 DIAGNOSIS — E11.65 UNCONTROLLED TYPE 2 DIABETES MELLITUS WITH HYPERGLYCEMIA (HCC): ICD-10-CM

## 2025-07-22 DIAGNOSIS — E78.2 MIXED HYPERLIPIDEMIA: ICD-10-CM

## 2025-07-22 DIAGNOSIS — Z23 IMMUNIZATION DUE: ICD-10-CM

## 2025-07-22 DIAGNOSIS — I10 ESSENTIAL (PRIMARY) HYPERTENSION: Primary | ICD-10-CM

## 2025-07-22 PROCEDURE — 90471 IMMUNIZATION ADMIN: CPT | Performed by: NURSE PRACTITIONER

## 2025-07-22 PROCEDURE — 3079F DIAST BP 80-89 MM HG: CPT | Performed by: NURSE PRACTITIONER

## 2025-07-22 PROCEDURE — 3052F HG A1C>EQUAL 8.0%<EQUAL 9.0%: CPT | Performed by: NURSE PRACTITIONER

## 2025-07-22 PROCEDURE — 90677 PCV20 VACCINE IM: CPT | Performed by: NURSE PRACTITIONER

## 2025-07-22 PROCEDURE — 3074F SYST BP LT 130 MM HG: CPT | Performed by: NURSE PRACTITIONER

## 2025-07-22 PROCEDURE — 99214 OFFICE O/P EST MOD 30 MIN: CPT | Performed by: NURSE PRACTITIONER

## 2025-07-22 RX ORDER — SIMVASTATIN 20 MG
20 TABLET ORAL NIGHTLY
Qty: 90 TABLET | Refills: 3 | Status: SHIPPED | OUTPATIENT
Start: 2025-07-22

## 2025-07-22 RX ORDER — OMEPRAZOLE 40 MG/1
40 CAPSULE, DELAYED RELEASE ORAL DAILY
Qty: 90 CAPSULE | Refills: 3 | Status: SHIPPED | OUTPATIENT
Start: 2025-07-22

## 2025-07-22 ASSESSMENT — ENCOUNTER SYMPTOMS: SHORTNESS OF BREATH: 0

## 2025-07-22 NOTE — PROGRESS NOTES
Chief Complaint   Patient presents with    6 Month Follow-Up     BP (!) 141/92 (BP Site: Right Upper Arm, Patient Position: Sitting)   Pulse 70   Temp 97.7 °F (36.5 °C) (Oral)   Resp 16   Ht 1.905 m (6' 3\")   Wt (!) 140.6 kg (310 lb)   SpO2 (!) 82%   BMI 38.75 kg/m²     Have you been to the ER, urgent care clinic since your last visit?  Hospitalized since your last visit?   NO    Have you seen or consulted any other health care providers outside our system since your last visit?   NO      “Have you had a colorectal cancer screening such as a colonoscopy/FIT/Cologuard?    NO    Date of last Colonoscopy: 1/13/2017  No cologuard on file  No FIT/FOBT on file   No flexible sigmoidoscopy on file

## 2025-07-22 NOTE — PROGRESS NOTES
Deon Foote is a 59 y.o. male who was seen in clinic today (7/22/2025).    Assessment & Plan:   Below is the assessment and plan developed based on review of pertinent history, physical exam, labs, studies, and medications.    1. Essential (primary) hypertension  Comments:  well controlled.   2. Gastro-esophageal reflux disease without esophagitis  Comments:  stable on omeprazole, increased to 40 mg by GI after endoscopy.  Orders:  -     omeprazole (PRILOSEC) 40 MG delayed release capsule; Take 1 capsule by mouth daily, Disp-90 capsule, R-3Normal  3. Mixed hyperlipidemia  Comments:  stable on simvastatin  Orders:  -     simvastatin (ZOCOR) 20 MG tablet; Take 1 tablet by mouth nightly, Disp-90 tablet, R-3Normal  4. Immunization due  -     Pneumococcal, PCV20, PREVNAR 20, (age 6w+), IM, PF  5. Uncontrolled type 2 diabetes mellitus with hyperglycemia (HCC)  Comments:  following with endocrinology.  now on insulin.  I recommended higher protein at meals to help stabilize glucose.      Return in about 1 year (around 7/22/2026) for Physical.    Subjective:   Deon was seen today for 6 Month Follow-Up     GERD:  Patient's acid reflux/GERD is well controlled on current regimen of omeprazole.     Hyperlipidemia: Mixed hyperlipidemia well controlled on simvastatin.   Denies any leg cramps or malaise from this medication.  Reported compliance with taking medication daily.  Labs reviewed from endocrinology on 4/28/25.        Hypertension: Patients hypertension is well controlled on regimen of entresto, spironolactone and coreg.  Denies headaches, blurred vision or dizziness.       CHF: Patient reported he is recently dx with CHF by cardiologist, Dr. Shabazz.   Patient reported that his EF on recent ECHO was 30%.  He was started on entresto, lasix and spironolactone.   Last visit was 1 week.      Diabetes: Last A1c was   Hemoglobin A1C   Date Value Ref Range Status   04/28/2025 8.6 (H) 4.0 - 5.6 % Final     Comment:

## (undated) DEVICE — WASTEBAG DRIP/ADAPTER: Brand: MEDLINE INDUSTRIES, INC.

## (undated) DEVICE — BOWL MED M 16OZ PLAS CAP GRAD

## (undated) DEVICE — CATHETER ANGIO JL4 0.054 INX6 FRX125 CM 1.9 CM PERFORMA

## (undated) DEVICE — SYRINGE MED 10ML RED POLYCARB BRL FIX M LUER CONN FLAT GRP

## (undated) DEVICE — SYRINGE ANGIO 10 CC POLYCARB DK GRN MEDALLION DISP

## (undated) DEVICE — TRAY SURG CUST CRD CATH 3 PRT SSR

## (undated) DEVICE — GLIDESHEATH SLENDER STAINLESS STEEL KIT: Brand: GLIDESHEATH SLENDER

## (undated) DEVICE — GUIDEWIRE VASC L260CM DIA0.035IN TIP L3MM STD EXCHG PTFE J

## (undated) DEVICE — 3M™ TEGADERM™ TRANSPARENT FILM DRESSING FRAME STYLE, 1626W, 4 IN X 4-3/4 IN (10 CM X 12 CM), 50/CT 4CT/CASE: Brand: 3M™ TEGADERM™

## (undated) DEVICE — GAUZE,SPONGE,4"X4",16PLY,STRL,LF,10/TRAY: Brand: MEDLINE

## (undated) DEVICE — DRAPE,APERTURE,MINOR PROCEDURE: Brand: MEDLINE

## (undated) DEVICE — Device

## (undated) DEVICE — SYRINGE 20ML LL S/C 50

## (undated) DEVICE — CATHETER ETER DIAG L125CM OD6FR VASC JUDKINS R ID4MM COR W O

## (undated) DEVICE — SC 3W MP RA OFF PB - PG: Brand: NAMIC

## (undated) DEVICE — ADAPTER IV TBNG CLR POLYCARB DBL M LUERLOCK

## (undated) DEVICE — 48" PROBE COVER W/GEL, ULTRASOUND, STERILE: Brand: SITE-RITE

## (undated) DEVICE — SYRINGE MED 10ML PUR GAM COMPATIBLE POLYCARB FIX M LUER CONN

## (undated) DEVICE — DEVICE COMPR LNG 27 CM VASC BND